# Patient Record
Sex: FEMALE | Race: WHITE | Employment: STUDENT | URBAN - METROPOLITAN AREA
[De-identification: names, ages, dates, MRNs, and addresses within clinical notes are randomized per-mention and may not be internally consistent; named-entity substitution may affect disease eponyms.]

---
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
## 2022-05-04 ENCOUNTER — HOSPITAL ENCOUNTER (EMERGENCY)
Age: 22
Discharge: LEFT BEFORE DISCHARGE | End: 2022-05-04
Payer: COMMERCIAL

## 2022-07-27 ENCOUNTER — TELEPHONE (OUTPATIENT)
Dept: OBGYN CLINIC | Facility: CLINIC | Age: 22
End: 2022-07-27

## 2022-07-27 NOTE — TELEPHONE ENCOUNTER
Patient states has been having a lot of pressure for months. Denies any fever, chills, or body aches. Discussed pain and bleeding precautions. If you experience bleeding that is heavy enough that you are soaking through a large/super tampon or large/overnight pad in an hour or less, if you experience unmanageable/severe pain, have chest pain/chest heaviness, shortness of breath, or feel faint/dizzy, please go to the ER. I don't anticipate you experiencing this, but want you to know what to do just in case.

## 2022-07-27 NOTE — TELEPHONE ENCOUNTER
Pt called stating for past few months she's been having a feeling of something coming out of vagina. Last few days feeling has gotten worse. And when she touches vagina can feel something. I did schedule pt for Friday but wasn't sure if she should be triaged.  Also she normally sees Iggy Clark but I got her in with an MD.    Future Appointments   Date Time Provider Krishna Bhakta   7/29/2022  7:30 AM Femi Thakkar MD [facility]

## 2022-07-29 ENCOUNTER — OFFICE VISIT (OUTPATIENT)
Dept: OBGYN CLINIC | Facility: CLINIC | Age: 22
End: 2022-07-29
Payer: COMMERCIAL

## 2022-07-29 VITALS
HEIGHT: 69 IN | SYSTOLIC BLOOD PRESSURE: 110 MMHG | DIASTOLIC BLOOD PRESSURE: 60 MMHG | BODY MASS INDEX: 26.9 KG/M2 | WEIGHT: 181.63 LBS

## 2022-07-29 DIAGNOSIS — R10.2 PELVIC PRESSURE IN FEMALE: ICD-10-CM

## 2022-07-29 DIAGNOSIS — Z80.3 FAMILY HISTORY OF BREAST CANCER: ICD-10-CM

## 2022-07-29 DIAGNOSIS — R19.8 RECTAL PRESSURE: ICD-10-CM

## 2022-07-29 DIAGNOSIS — R10.2 PELVIC PAIN: Primary | ICD-10-CM

## 2022-07-29 DIAGNOSIS — K59.00 CONSTIPATION, UNSPECIFIED CONSTIPATION TYPE: ICD-10-CM

## 2022-07-29 PROCEDURE — 87660 TRICHOMONAS VAGIN DIR PROBE: CPT | Performed by: OBSTETRICS & GYNECOLOGY

## 2022-07-29 PROCEDURE — 87510 GARDNER VAG DNA DIR PROBE: CPT | Performed by: OBSTETRICS & GYNECOLOGY

## 2022-07-29 PROCEDURE — 3008F BODY MASS INDEX DOCD: CPT | Performed by: OBSTETRICS & GYNECOLOGY

## 2022-07-29 PROCEDURE — 99214 OFFICE O/P EST MOD 30 MIN: CPT | Performed by: OBSTETRICS & GYNECOLOGY

## 2022-07-29 PROCEDURE — 3074F SYST BP LT 130 MM HG: CPT | Performed by: OBSTETRICS & GYNECOLOGY

## 2022-07-29 PROCEDURE — 3078F DIAST BP <80 MM HG: CPT | Performed by: OBSTETRICS & GYNECOLOGY

## 2022-07-29 PROCEDURE — 87480 CANDIDA DNA DIR PROBE: CPT | Performed by: OBSTETRICS & GYNECOLOGY

## 2022-07-29 RX ORDER — DESOGESTREL AND ETHINYL ESTRADIOL AND ETHINYL ESTRADIOL 21-5 (28)
1 KIT ORAL DAILY
COMMUNITY
Start: 2022-05-27

## 2022-08-16 RX ORDER — DESOGESTREL AND ETHINYL ESTRADIOL AND ETHINYL ESTRADIOL 21-5 (28)
1 KIT ORAL DAILY
Qty: 84 TABLET | Refills: 0 | Status: SHIPPED | OUTPATIENT
Start: 2022-08-16

## 2022-09-13 PROBLEM — S01.101A OPEN WOUND OF RIGHT PERIOCULAR AREA: Status: ACTIVE | Noted: 2020-10-23

## 2022-09-13 PROBLEM — S73.109A HIP SPRAIN: Status: ACTIVE | Noted: 2020-11-19

## 2022-10-23 ENCOUNTER — HOSPITAL ENCOUNTER (EMERGENCY)
Age: 22
Discharge: HOME | End: 2022-10-24
Attending: EMERGENCY MEDICINE
Payer: COMMERCIAL

## 2022-10-23 DIAGNOSIS — L30.9 ECZEMA, UNSPECIFIED TYPE: Primary | ICD-10-CM

## 2022-10-23 DIAGNOSIS — L25.9 CONTACT DERMATITIS, UNSPECIFIED CONTACT DERMATITIS TYPE, UNSPECIFIED TRIGGER: ICD-10-CM

## 2022-10-23 PROCEDURE — 99283 EMERGENCY DEPT VISIT LOW MDM: CPT

## 2022-10-24 VITALS
OXYGEN SATURATION: 98 % | SYSTOLIC BLOOD PRESSURE: 122 MMHG | DIASTOLIC BLOOD PRESSURE: 75 MMHG | BODY MASS INDEX: 24.44 KG/M2 | TEMPERATURE: 98 F | WEIGHT: 165 LBS | HEIGHT: 69 IN | RESPIRATION RATE: 18 BRPM | HEART RATE: 63 BPM

## 2022-10-24 RX ORDER — PREDNISONE 50 MG/1
50 TABLET ORAL DAILY
Qty: 5 TABLET | Refills: 0 | Status: SHIPPED | OUTPATIENT
Start: 2022-10-24

## 2022-10-24 RX ORDER — FAMOTIDINE 20 MG/1
20 TABLET, FILM COATED ORAL 2 TIMES DAILY
Qty: 10 TABLET | Refills: 0 | Status: SHIPPED | OUTPATIENT
Start: 2022-10-24 | End: 2022-10-29

## 2022-10-24 RX ORDER — PREDNISONE 20 MG/1
60 TABLET ORAL ONCE
Status: COMPLETED | OUTPATIENT
Start: 2022-10-24 | End: 2022-10-24

## 2022-10-24 RX ORDER — DIPHENHYDRAMINE HCL 25 MG
50 CAPSULE ORAL EVERY 8 HOURS PRN
Qty: 30 CAPSULE | Refills: 0 | Status: SHIPPED | OUTPATIENT
Start: 2022-10-24

## 2022-10-24 RX ORDER — FAMOTIDINE 20 MG/1
20 TABLET, FILM COATED ORAL ONCE
Status: COMPLETED | OUTPATIENT
Start: 2022-10-24 | End: 2022-10-24

## 2022-10-24 RX ORDER — DIPHENHYDRAMINE HCL 50 MG
50 CAPSULE ORAL ONCE
Status: COMPLETED | OUTPATIENT
Start: 2022-10-24 | End: 2022-10-24

## 2022-10-24 RX ORDER — LIDOCAINE HYDROCHLORIDE 20 MG/ML
10 SOLUTION OROPHARYNGEAL ONCE
Status: COMPLETED | OUTPATIENT
Start: 2022-10-24 | End: 2022-10-24

## 2022-10-24 RX ORDER — LIDOCAINE HYDROCHLORIDE 20 MG/ML
SOLUTION OROPHARYNGEAL
Qty: 100 ML | Refills: 0 | Status: SHIPPED | OUTPATIENT
Start: 2022-10-24

## 2022-10-24 NOTE — ED INITIAL ASSESSMENT (HPI)
Pt to ed with c./o eczema and hives to both hands, \"they feel like they're on fire\"   Hives started Friday morning

## 2022-11-04 ENCOUNTER — LAB ENCOUNTER (OUTPATIENT)
Dept: LAB | Age: 22
End: 2022-11-04
Attending: INTERNAL MEDICINE
Payer: COMMERCIAL

## 2022-11-04 DIAGNOSIS — R63.4 WEIGHT LOSS: ICD-10-CM

## 2022-11-04 DIAGNOSIS — R10.13 EPIGASTRIC PAIN: ICD-10-CM

## 2022-11-04 DIAGNOSIS — R10.30 LOWER ABDOMINAL PAIN: ICD-10-CM

## 2022-11-04 DIAGNOSIS — Z01.818 PRE-OP TESTING: ICD-10-CM

## 2022-11-04 DIAGNOSIS — K59.00 CONSTIPATION, UNSPECIFIED CONSTIPATION TYPE: ICD-10-CM

## 2022-11-04 DIAGNOSIS — R11.2 NAUSEA AND VOMITING, UNSPECIFIED VOMITING TYPE: ICD-10-CM

## 2022-11-04 DIAGNOSIS — R12 CHRONIC HEARTBURN: ICD-10-CM

## 2022-11-04 LAB
ALBUMIN SERPL-MCNC: 4.4 G/DL (ref 3.4–5)
ALBUMIN/GLOB SERPL: 1.4 {RATIO} (ref 1–2)
ALP LIVER SERPL-CCNC: 66 U/L
ALT SERPL-CCNC: 24 U/L
ANION GAP SERPL CALC-SCNC: 7 MMOL/L (ref 0–18)
AST SERPL-CCNC: 34 U/L (ref 15–37)
BASOPHILS # BLD AUTO: 0.03 X10(3) UL (ref 0–0.2)
BASOPHILS NFR BLD AUTO: 0.4 %
BILIRUB SERPL-MCNC: 1.2 MG/DL (ref 0.1–2)
BUN BLD-MCNC: 17 MG/DL (ref 7–18)
CALCIUM BLD-MCNC: 9 MG/DL (ref 8.5–10.1)
CHLORIDE SERPL-SCNC: 107 MMOL/L (ref 98–112)
CO2 SERPL-SCNC: 26 MMOL/L (ref 21–32)
CREAT BLD-MCNC: 0.86 MG/DL
EOSINOPHIL # BLD AUTO: 0.02 X10(3) UL (ref 0–0.7)
EOSINOPHIL NFR BLD AUTO: 0.3 %
ERYTHROCYTE [DISTWIDTH] IN BLOOD BY AUTOMATED COUNT: 12.7 %
FASTING STATUS PATIENT QL REPORTED: NO
GFR SERPLBLD BASED ON 1.73 SQ M-ARVRAT: 99 ML/MIN/1.73M2 (ref 60–?)
GLOBULIN PLAS-MCNC: 3.2 G/DL (ref 2.8–4.4)
GLUCOSE BLD-MCNC: 78 MG/DL (ref 70–99)
HCT VFR BLD AUTO: 41.8 %
HGB BLD-MCNC: 13.6 G/DL
IMM GRANULOCYTES # BLD AUTO: 0.02 X10(3) UL (ref 0–1)
IMM GRANULOCYTES NFR BLD: 0.3 %
LYMPHOCYTES # BLD AUTO: 1.67 X10(3) UL (ref 1–4)
LYMPHOCYTES NFR BLD AUTO: 24.1 %
MCH RBC QN AUTO: 30.8 PG (ref 26–34)
MCHC RBC AUTO-ENTMCNC: 32.5 G/DL (ref 31–37)
MCV RBC AUTO: 94.8 FL
MONOCYTES # BLD AUTO: 0.55 X10(3) UL (ref 0.1–1)
MONOCYTES NFR BLD AUTO: 7.9 %
NEUTROPHILS # BLD AUTO: 4.63 X10 (3) UL (ref 1.5–7.7)
NEUTROPHILS # BLD AUTO: 4.63 X10(3) UL (ref 1.5–7.7)
NEUTROPHILS NFR BLD AUTO: 67 %
OSMOLALITY SERPL CALC.SUM OF ELEC: 290 MOSM/KG (ref 275–295)
PLATELET # BLD AUTO: 276 10(3)UL (ref 150–450)
POTASSIUM SERPL-SCNC: 4.1 MMOL/L (ref 3.5–5.1)
PROT SERPL-MCNC: 7.6 G/DL (ref 6.4–8.2)
RBC # BLD AUTO: 4.41 X10(6)UL
SODIUM SERPL-SCNC: 140 MMOL/L (ref 136–145)
T4 FREE SERPL-MCNC: 1 NG/DL (ref 0.8–1.7)
TSI SER-ACNC: 0.75 MIU/ML (ref 0.36–3.74)
WBC # BLD AUTO: 6.9 X10(3) UL (ref 4–11)

## 2022-11-04 PROCEDURE — 84443 ASSAY THYROID STIM HORMONE: CPT

## 2022-11-04 PROCEDURE — 80053 COMPREHEN METABOLIC PANEL: CPT

## 2022-11-04 PROCEDURE — 85025 COMPLETE CBC W/AUTO DIFF WBC: CPT

## 2022-11-04 PROCEDURE — 36415 COLL VENOUS BLD VENIPUNCTURE: CPT

## 2022-11-04 PROCEDURE — 84439 ASSAY OF FREE THYROXINE: CPT

## 2022-11-05 LAB — SARS-COV-2 RNA RESP QL NAA+PROBE: NOT DETECTED

## 2022-11-07 PROBLEM — R10.13 ABDOMINAL PAIN, EPIGASTRIC: Status: ACTIVE | Noted: 2022-11-07

## 2022-11-07 PROBLEM — R11.0 NAUSEA: Status: ACTIVE | Noted: 2022-11-07

## 2022-11-07 PROBLEM — K21.9 GASTROESOPHAGEAL REFLUX DISEASE WITHOUT ESOPHAGITIS: Status: ACTIVE | Noted: 2022-11-07

## 2022-11-07 PROBLEM — K59.00 CONSTIPATION, UNSPECIFIED: Status: ACTIVE | Noted: 2022-11-07

## 2022-11-07 PROBLEM — K29.30 CHRONIC SUPERFICIAL GASTRITIS WITHOUT BLEEDING: Status: ACTIVE | Noted: 2022-11-07

## 2022-11-07 PROBLEM — R12 CHRONIC HEARTBURN: Status: ACTIVE | Noted: 2022-11-07

## 2022-11-07 PROBLEM — R11.12 PROJECTILE VOMITING: Status: ACTIVE | Noted: 2022-11-07

## 2022-11-07 PROBLEM — R19.4 CHANGE IN BOWEL HABITS: Status: ACTIVE | Noted: 2022-11-07

## 2022-11-29 ENCOUNTER — TELEPHONE (OUTPATIENT)
Dept: OBGYN CLINIC | Facility: CLINIC | Age: 22
End: 2022-11-29

## 2022-11-29 NOTE — TELEPHONE ENCOUNTER
Last OV: 7/29/22 with Dr. Marianne Roberto for gyn problem; last annual: 9/2021  Last refill date: 8/16/22  Follow-up: annual  Next appt.: none scheduled    Patient due for annual. Please contact her to schedule appt and then return to RN pool for refill.  Thank you

## 2022-11-30 RX ORDER — DESOGESTREL AND ETHINYL ESTRADIOL 21-5 (28)
1 KIT ORAL DAILY
Qty: 84 TABLET | Refills: 0 | Status: SHIPPED | OUTPATIENT
Start: 2022-11-30

## 2022-11-30 NOTE — TELEPHONE ENCOUNTER
Future Appointments   Date Time Provider Krishna Bhakta   1/11/2023  1:00 PM REYMUNDO Thrasher [facility]   1/19/2023 12:00 PM KLAEB Cardona [facility] [address]     Please refill until appointment time

## 2022-12-07 NOTE — ED PROVIDER NOTES
Patient Seen in: [facility] Emergency Department    History   Patient presents with:  Eval-P (psychiatric)    Stated Complaint: eval p    HPI    29-year-old female with a history of anxiety and depression who is brought here by EMS with suicidal verenice Diabetes Paternal Grandmother    • Hypertension Paternal Grandmother    • Lipids Paternal Grandmother    • Cancer Paternal Grandfather      lymphoma   • Hypertension Paternal Grandfather        Smoking status: Never Smoker exhibits no tenderness. Abdominal: Soft. Bowel sounds are normal. She exhibits no distension and no mass. There is no tenderness. There is no rebound and no guarding. Musculoskeletal: Normal range of motion. She exhibits no edema or tenderness.    Lymph emergency department or contact PCP for persistent, recurrent, or worsening symptoms. Disposition and Plan     Clinical Impression:  Suicidal ideation  (primary encounter diagnosis)    Disposition:  There is no disposition on file for this visit.     Goyo Vazquez 145

## 2023-01-17 ENCOUNTER — HOSPITAL ENCOUNTER (EMERGENCY)
Age: 23
Discharge: HOME | End: 2023-01-17
Payer: COMMERCIAL

## 2023-01-17 VITALS
RESPIRATION RATE: 20 BRPM | TEMPERATURE: 98 F | HEART RATE: 57 BPM | SYSTOLIC BLOOD PRESSURE: 110 MMHG | HEIGHT: 69 IN | WEIGHT: 156 LBS | DIASTOLIC BLOOD PRESSURE: 62 MMHG | OXYGEN SATURATION: 100 % | BODY MASS INDEX: 23.11 KG/M2

## 2023-01-17 DIAGNOSIS — M62.838 SPASM OF MUSCLE: Primary | ICD-10-CM

## 2023-01-17 DIAGNOSIS — B35.4 TINEA CORPORIS: ICD-10-CM

## 2023-01-17 DIAGNOSIS — S29.012A STRAIN OF THORACIC PARASPINAL MUSCLES EXCLUDING T1 AND T2 LEVELS, INITIAL ENCOUNTER: ICD-10-CM

## 2023-01-17 LAB
ALBUMIN SERPL-MCNC: 4.3 G/DL (ref 3.4–5)
ALBUMIN/GLOB SERPL: 1.3 {RATIO} (ref 1–2)
ALP LIVER SERPL-CCNC: 78 U/L
ALT SERPL-CCNC: 21 U/L
ANION GAP SERPL CALC-SCNC: 6 MMOL/L (ref 0–18)
AST SERPL-CCNC: 34 U/L (ref 15–37)
B-HCG UR QL: NEGATIVE
BASOPHILS # BLD AUTO: 0.02 X10(3) UL (ref 0–0.2)
BASOPHILS NFR BLD AUTO: 0.3 %
BILIRUB SERPL-MCNC: 0.9 MG/DL (ref 0.1–2)
BILIRUB UR QL STRIP.AUTO: NEGATIVE
BUN BLD-MCNC: 13 MG/DL (ref 7–18)
CALCIUM BLD-MCNC: 9.3 MG/DL (ref 8.5–10.1)
CHLORIDE SERPL-SCNC: 106 MMOL/L (ref 98–112)
CLARITY UR REFRACT.AUTO: CLEAR
CO2 SERPL-SCNC: 27 MMOL/L (ref 21–32)
COLOR UR AUTO: YELLOW
CREAT BLD-MCNC: 0.91 MG/DL
EOSINOPHIL # BLD AUTO: 0.05 X10(3) UL (ref 0–0.7)
EOSINOPHIL NFR BLD AUTO: 0.8 %
ERYTHROCYTE [DISTWIDTH] IN BLOOD BY AUTOMATED COUNT: 13.3 %
GFR SERPLBLD BASED ON 1.73 SQ M-ARVRAT: 93 ML/MIN/1.73M2 (ref 60–?)
GLOBULIN PLAS-MCNC: 3.4 G/DL (ref 2.8–4.4)
GLUCOSE BLD-MCNC: 101 MG/DL (ref 70–99)
GLUCOSE UR STRIP.AUTO-MCNC: NEGATIVE MG/DL
HCT VFR BLD AUTO: 39.5 %
HGB BLD-MCNC: 12.9 G/DL
IMM GRANULOCYTES # BLD AUTO: 0.01 X10(3) UL (ref 0–1)
IMM GRANULOCYTES NFR BLD: 0.2 %
KETONES UR STRIP.AUTO-MCNC: NEGATIVE MG/DL
LEUKOCYTE ESTERASE UR QL STRIP.AUTO: NEGATIVE
LYMPHOCYTES # BLD AUTO: 1.44 X10(3) UL (ref 1–4)
LYMPHOCYTES NFR BLD AUTO: 22.5 %
MCH RBC QN AUTO: 30.6 PG (ref 26–34)
MCHC RBC AUTO-ENTMCNC: 32.7 G/DL (ref 31–37)
MCV RBC AUTO: 93.8 FL
MONOCYTES # BLD AUTO: 0.41 X10(3) UL (ref 0.1–1)
MONOCYTES NFR BLD AUTO: 6.4 %
NEUTROPHILS # BLD AUTO: 4.47 X10 (3) UL (ref 1.5–7.7)
NEUTROPHILS # BLD AUTO: 4.47 X10(3) UL (ref 1.5–7.7)
NEUTROPHILS NFR BLD AUTO: 69.8 %
NITRITE UR QL STRIP.AUTO: NEGATIVE
OSMOLALITY SERPL CALC.SUM OF ELEC: 288 MOSM/KG (ref 275–295)
PH UR STRIP.AUTO: 7.5 [PH] (ref 5–8)
PLATELET # BLD AUTO: 270 10(3)UL (ref 150–450)
POTASSIUM SERPL-SCNC: 3.9 MMOL/L (ref 3.5–5.1)
PROT SERPL-MCNC: 7.7 G/DL (ref 6.4–8.2)
PROT UR STRIP.AUTO-MCNC: NEGATIVE MG/DL
RBC # BLD AUTO: 4.21 X10(6)UL
RBC UR QL AUTO: NEGATIVE
SODIUM SERPL-SCNC: 139 MMOL/L (ref 136–145)
SP GR UR STRIP.AUTO: 1.02 (ref 1–1.03)
UROBILINOGEN UR STRIP.AUTO-MCNC: 0.2 MG/DL
WBC # BLD AUTO: 6.4 X10(3) UL (ref 4–11)

## 2023-01-17 PROCEDURE — 85025 COMPLETE CBC W/AUTO DIFF WBC: CPT | Performed by: PHYSICIAN ASSISTANT

## 2023-01-17 PROCEDURE — 81025 URINE PREGNANCY TEST: CPT

## 2023-01-17 PROCEDURE — 99284 EMERGENCY DEPT VISIT MOD MDM: CPT

## 2023-01-17 PROCEDURE — 96361 HYDRATE IV INFUSION ADD-ON: CPT

## 2023-01-17 PROCEDURE — 80053 COMPREHEN METABOLIC PANEL: CPT | Performed by: PHYSICIAN ASSISTANT

## 2023-01-17 PROCEDURE — 81003 URINALYSIS AUTO W/O SCOPE: CPT | Performed by: PHYSICIAN ASSISTANT

## 2023-01-17 PROCEDURE — 96374 THER/PROPH/DIAG INJ IV PUSH: CPT

## 2023-01-17 PROCEDURE — 96375 TX/PRO/DX INJ NEW DRUG ADDON: CPT

## 2023-01-17 RX ORDER — METHYLPREDNISOLONE 4 MG/1
TABLET ORAL
Qty: 1 EACH | Refills: 0 | Status: SHIPPED | OUTPATIENT
Start: 2023-01-17

## 2023-01-17 RX ORDER — METHOCARBAMOL 750 MG/1
750 TABLET, FILM COATED ORAL 4 TIMES DAILY
Qty: 21 TABLET | Refills: 0 | Status: SHIPPED | OUTPATIENT
Start: 2023-01-17

## 2023-01-17 RX ORDER — DEXAMETHASONE SODIUM PHOSPHATE 10 MG/ML
10 INJECTION, SOLUTION INTRAMUSCULAR; INTRAVENOUS ONCE
Status: COMPLETED | OUTPATIENT
Start: 2023-01-17 | End: 2023-01-17

## 2023-01-17 RX ORDER — KETOROLAC TROMETHAMINE 30 MG/ML
30 INJECTION, SOLUTION INTRAMUSCULAR; INTRAVENOUS ONCE
Status: COMPLETED | OUTPATIENT
Start: 2023-01-17 | End: 2023-01-17

## 2023-01-17 RX ORDER — ONDANSETRON 2 MG/ML
4 INJECTION INTRAMUSCULAR; INTRAVENOUS ONCE
Status: COMPLETED | OUTPATIENT
Start: 2023-01-17 | End: 2023-01-17

## 2023-01-17 RX ORDER — NAPROXEN 500 MG/1
500 TABLET ORAL 2 TIMES DAILY PRN
Qty: 20 TABLET | Refills: 0 | Status: SHIPPED | OUTPATIENT
Start: 2023-01-17 | End: 2023-01-24

## 2023-01-17 RX ORDER — DIAZEPAM 5 MG/1
5 TABLET ORAL ONCE
Status: COMPLETED | OUTPATIENT
Start: 2023-01-17 | End: 2023-01-17

## 2023-01-17 RX ORDER — KETOCONAZOLE 20 MG/G
1 CREAM TOPICAL DAILY
Qty: 15 G | Refills: 0 | Status: SHIPPED | OUTPATIENT
Start: 2023-01-17

## 2023-01-17 NOTE — DISCHARGE INSTRUCTIONS
Steroid in the morning with breakfast.  Naproxen in the afternoon and evening. Muscle relaxant at night. This medication will cause sedation. Push clear fluids. Ice the area.   Progress activity as tolerated

## 2024-09-04 ENCOUNTER — OFFICE VISIT (OUTPATIENT)
Dept: FAMILY MEDICINE CLINIC | Facility: CLINIC | Age: 24
End: 2024-09-04
Payer: COMMERCIAL

## 2024-09-04 VITALS
HEART RATE: 93 BPM | OXYGEN SATURATION: 99 % | RESPIRATION RATE: 18 BRPM | SYSTOLIC BLOOD PRESSURE: 120 MMHG | DIASTOLIC BLOOD PRESSURE: 68 MMHG | HEIGHT: 69 IN | TEMPERATURE: 98 F | WEIGHT: 151 LBS | BODY MASS INDEX: 22.36 KG/M2

## 2024-09-04 DIAGNOSIS — S16.1XXA STRAIN OF NECK MUSCLE, INITIAL ENCOUNTER: ICD-10-CM

## 2024-09-04 DIAGNOSIS — G44.209 TENSION HEADACHE: Primary | ICD-10-CM

## 2024-09-04 DIAGNOSIS — F32.A DEPRESSION, UNSPECIFIED DEPRESSION TYPE: ICD-10-CM

## 2024-09-04 DIAGNOSIS — M62.838 NECK MUSCLE SPASM: ICD-10-CM

## 2024-09-04 PROCEDURE — 3074F SYST BP LT 130 MM HG: CPT | Performed by: FAMILY MEDICINE

## 2024-09-04 PROCEDURE — 3008F BODY MASS INDEX DOCD: CPT | Performed by: FAMILY MEDICINE

## 2024-09-04 PROCEDURE — 3078F DIAST BP <80 MM HG: CPT | Performed by: FAMILY MEDICINE

## 2024-09-04 PROCEDURE — 99214 OFFICE O/P EST MOD 30 MIN: CPT | Performed by: FAMILY MEDICINE

## 2024-09-04 RX ORDER — BACLOFEN 10 MG/1
10 TABLET ORAL NIGHTLY
Qty: 30 TABLET | Refills: 0 | Status: SHIPPED | OUTPATIENT
Start: 2024-09-04 | End: 2024-10-04

## 2024-09-04 RX ORDER — ESCITALOPRAM OXALATE 10 MG/1
10 TABLET ORAL DAILY
Qty: 30 TABLET | Refills: 2 | Status: SHIPPED | OUTPATIENT
Start: 2024-09-04

## 2024-09-16 NOTE — PROGRESS NOTES
Subjective:   Negar Mishra is a 24 year old female who presents for Lump (Patient states she noticed a lump on the back right side of her neck 3 weeks ago. Patient states it is tender to touch.) and Headache (Patient states she has been having headaches for the past 4 months.) Patient states that she has been having neck pain and a lump in the neck for the last few months with increase in headaches. Patient states that she has been worried that she has something seriously wrong with her health. Patient states that she was previously on antidepressive medications and that she feels like she needs to get back on the antidepressants because her mood was much better when taking that medication.       History/Other:    Chief Complaint Reviewed and Verified  Nursing Notes Reviewed and   Verified  Tobacco Reviewed  Allergies Reviewed  Medications Reviewed    OB Status Reviewed  Social History Reviewed         Tobacco:  Social History     Tobacco Use   Smoking Status Never   Smokeless Tobacco Never     E-Cigarettes/Vaping       Questions Responses    E-Cigarette Use Current Every Day User          E-Cigarette/Vaping Substances       Questions Responses    Nicotine Yes           Tobacco cessation counseling for <3 minutes.      Current Outpatient Medications   Medication Sig Dispense Refill    escitalopram 10 MG Oral Tab Take 1 tablet (10 mg total) by mouth daily. 30 tablet 2    baclofen 10 MG Oral Tab Take 1 tablet (10 mg total) by mouth at bedtime. 30 tablet 0    diphenhydrAMINE 25 MG Oral Cap Take 2 capsules (50 mg total) by mouth every 8 (eight) hours as needed for Itching. 30 capsule 0         Review of Systems:  Review of Systems   Constitutional: Negative.    HENT:  Positive for congestion, postnasal drip and rhinorrhea.    Respiratory: Negative.     Cardiovascular: Negative.    Gastrointestinal: Negative.    Musculoskeletal:  Positive for myalgias, neck pain and neck stiffness.   Skin: Negative.     Allergic/Immunologic: Positive for environmental allergies.   Neurological:  Positive for headaches.   Psychiatric/Behavioral:  Positive for decreased concentration and dysphoric mood. The patient is nervous/anxious.        Objective:   /68 (BP Location: Right arm, Patient Position: Sitting, Cuff Size: adult)   Pulse 93   Temp 97.8 °F (36.6 °C) (Temporal)   Resp 18   Ht 5' 9\" (1.753 m)   Wt 151 lb (68.5 kg)   LMP 08/31/2024 (Exact Date)   SpO2 99%   BMI 22.30 kg/m²  Estimated body mass index is 22.3 kg/m² as calculated from the following:    Height as of this encounter: 5' 9\" (1.753 m).    Weight as of this encounter: 151 lb (68.5 kg).  Physical Exam  Vitals reviewed.   Constitutional:       General: She is not in acute distress.     Appearance: Normal appearance. She is normal weight. She is not ill-appearing.   HENT:      Head: Normocephalic and atraumatic.      Right Ear: There is no impacted cerumen.      Nose: Nose normal.      Mouth/Throat:      Mouth: Mucous membranes are moist.      Pharynx: Oropharynx is clear.   Eyes:      Conjunctiva/sclera: Conjunctivae normal.      Pupils: Pupils are equal, round, and reactive to light.   Cardiovascular:      Rate and Rhythm: Normal rate and regular rhythm.      Pulses: Normal pulses.      Heart sounds: Normal heart sounds.   Pulmonary:      Effort: Pulmonary effort is normal.      Breath sounds: Normal breath sounds.   Abdominal:      General: Abdomen is flat.      Palpations: Abdomen is soft.   Musculoskeletal:         General: Tenderness present. No signs of injury.      Cervical back: Neck supple. Torticollis present. Pain with movement present. Decreased range of motion.   Skin:     General: Skin is warm and dry.      Capillary Refill: Capillary refill takes less than 2 seconds.   Neurological:      Mental Status: She is alert.   Psychiatric:         Mood and Affect: Mood normal.         Behavior: Behavior normal.         Thought Content: Thought  content normal.         Judgment: Judgment normal.           Assessment & Plan:   1. Tension headache (Primary)  -     Baclofen; Take 1 tablet (10 mg total) by mouth at bedtime.  Dispense: 30 tablet; Refill: 0  2. Strain of neck muscle, initial encounter  -     Baclofen; Take 1 tablet (10 mg total) by mouth at bedtime.  Dispense: 30 tablet; Refill: 0  3. Neck muscle spasm  -     Baclofen; Take 1 tablet (10 mg total) by mouth at bedtime.  Dispense: 30 tablet; Refill: 0  4. Depression, unspecified depression type  -     Escitalopram Oxalate; Take 1 tablet (10 mg total) by mouth daily.  Dispense: 30 tablet; Refill: 2  Use heat, massage, and the medication to help relieve the muscle tension.   Use heat, massage, and stretching every time you take a muscle relaxer otherwise they will not have the desired effects.   Muscle relaxants can make you very tired. Be aware of this before taking the medication.   If you have continued pain or discomfort. Follow up with this office immediately.   If pain or discomfort does not progressively get better, will consider PT for evaluation and treatment.       Follow up in 1 month.   REYMUNDO Valderrama, 9/16/2024, 12:19 PM

## 2024-10-08 ENCOUNTER — OFFICE VISIT (OUTPATIENT)
Dept: FAMILY MEDICINE CLINIC | Facility: CLINIC | Age: 24
End: 2024-10-08
Payer: COMMERCIAL

## 2024-10-08 VITALS
TEMPERATURE: 98 F | OXYGEN SATURATION: 99 % | SYSTOLIC BLOOD PRESSURE: 110 MMHG | RESPIRATION RATE: 18 BRPM | HEART RATE: 71 BPM | BODY MASS INDEX: 22.69 KG/M2 | WEIGHT: 153.19 LBS | HEIGHT: 69 IN | DIASTOLIC BLOOD PRESSURE: 64 MMHG

## 2024-10-08 DIAGNOSIS — R51.9 CHRONIC NONINTRACTABLE HEADACHE, UNSPECIFIED HEADACHE TYPE: Primary | ICD-10-CM

## 2024-10-08 DIAGNOSIS — Z23 NEED FOR VACCINATION: ICD-10-CM

## 2024-10-08 DIAGNOSIS — G89.29 CHRONIC NONINTRACTABLE HEADACHE, UNSPECIFIED HEADACHE TYPE: Primary | ICD-10-CM

## 2024-10-08 DIAGNOSIS — M62.838 NECK MUSCLE SPASM: ICD-10-CM

## 2024-10-08 DIAGNOSIS — Z13.79 GENETIC TESTING OF FEMALE: ICD-10-CM

## 2024-10-08 DIAGNOSIS — S16.1XXA STRAIN OF NECK MUSCLE, INITIAL ENCOUNTER: ICD-10-CM

## 2024-10-08 DIAGNOSIS — G44.209 TENSION HEADACHE: ICD-10-CM

## 2024-10-08 DIAGNOSIS — F32.A DEPRESSION, UNSPECIFIED DEPRESSION TYPE: ICD-10-CM

## 2024-10-08 PROCEDURE — 3074F SYST BP LT 130 MM HG: CPT | Performed by: FAMILY MEDICINE

## 2024-10-08 PROCEDURE — 99214 OFFICE O/P EST MOD 30 MIN: CPT | Performed by: FAMILY MEDICINE

## 2024-10-08 PROCEDURE — 90471 IMMUNIZATION ADMIN: CPT | Performed by: FAMILY MEDICINE

## 2024-10-08 PROCEDURE — 90656 IIV3 VACC NO PRSV 0.5 ML IM: CPT | Performed by: FAMILY MEDICINE

## 2024-10-08 PROCEDURE — 3008F BODY MASS INDEX DOCD: CPT | Performed by: FAMILY MEDICINE

## 2024-10-08 PROCEDURE — 3078F DIAST BP <80 MM HG: CPT | Performed by: FAMILY MEDICINE

## 2024-10-09 RX ORDER — ESCITALOPRAM OXALATE 20 MG/1
20 TABLET ORAL DAILY
Qty: 30 TABLET | Refills: 2 | Status: SHIPPED | OUTPATIENT
Start: 2024-10-09

## 2024-10-14 NOTE — PROGRESS NOTES
Subjective:   Negar Mishra is a 24 year old female who presents for Headache (Patient states still having tension headaches.) and Neck Pain (Patient states still having neck pain, not sure what could be causing it.)   Patient states that she is still having headaches and muscle tension in the head and neck. Patient states that she is using the mediations as prescribed. Patient states that she would like to increase her lexapro as discussed in previous visit. Patient is also concerned about EDS. Patient states that she has a cousin that has EDS and she is wondering if she has it due to her extensive msk injury hx.       History/Other:    Chief Complaint Reviewed and Verified  Nursing Notes Reviewed and   Verified  Tobacco Reviewed  Allergies Reviewed  Medications Reviewed    Problem List Reviewed  Medical History Reviewed  Surgical History   Reviewed  OB Status Reviewed  Family History Reviewed  Social History   Reviewed         Tobacco:  Social History     Tobacco Use   Smoking Status Never   Smokeless Tobacco Never     E-Cigarettes/Vaping       Questions Responses    E-Cigarette Use Current Every Day User          E-Cigarette/Vaping Substances       Questions Responses    Nicotine Yes           Tobacco cessation counseling for <3 minutes.      Current Outpatient Medications   Medication Sig Dispense Refill    baclofen 10 MG Oral Tab Take 1 tablet (10 mg total) by mouth at bedtime. 30 tablet 0    escitalopram 20 MG Oral Tab Take 1 tablet (20 mg total) by mouth daily. 30 tablet 2    diphenhydrAMINE 25 MG Oral Cap Take 2 capsules (50 mg total) by mouth every 8 (eight) hours as needed for Itching. (Patient not taking: Reported on 10/8/2024) 30 capsule 0         Review of Systems:  Review of Systems   Constitutional: Negative.    HENT: Negative.     Eyes: Negative.    Respiratory: Negative.     Cardiovascular: Negative.    Gastrointestinal: Negative.    Endocrine: Negative.    Genitourinary: Negative.     Musculoskeletal:  Positive for myalgias, neck pain and neck stiffness.   Skin: Negative.    Neurological:  Positive for headaches.   Psychiatric/Behavioral:  Positive for dysphoric mood. The patient is nervous/anxious.      Objective:   /64 (BP Location: Right arm, Patient Position: Sitting, Cuff Size: adult)   Pulse 71   Temp 97.5 °F (36.4 °C) (Temporal)   Resp 18   Ht 5' 9\" (1.753 m)   Wt 153 lb 3.2 oz (69.5 kg)   LMP 10/05/2024 (Exact Date)   SpO2 99%   BMI 22.62 kg/m²  Estimated body mass index is 22.62 kg/m² as calculated from the following:    Height as of this encounter: 5' 9\" (1.753 m).    Weight as of this encounter: 153 lb 3.2 oz (69.5 kg).  Physical Exam  Constitutional:       General: She is not in acute distress.     Appearance: Normal appearance. She is well-developed. She is not ill-appearing.   HENT:      Head: Normocephalic and atraumatic.      Nose: Nose normal.      Mouth/Throat:      Mouth: Mucous membranes are moist.      Pharynx: Oropharynx is clear.   Eyes:      Conjunctiva/sclera: Conjunctivae normal.   Cardiovascular:      Rate and Rhythm: Normal rate.   Pulmonary:      Effort: Pulmonary effort is normal.   Musculoskeletal:      Cervical back: Rigidity present.   Skin:     General: Skin is warm and dry.   Neurological:      General: No focal deficit present.      Mental Status: She is alert and oriented to person, place, and time.   Psychiatric:         Mood and Affect: Mood is anxious and depressed.         Behavior: Behavior normal.         Thought Content: Thought content normal.         Judgment: Judgment normal.           Assessment & Plan:   1. Chronic nonintractable headache, unspecified headache type (Primary)  -     Neuro Referral - In Network  2. Depression, unspecified depression type  -     Escitalopram Oxalate; Take 1 tablet (20 mg total) by mouth daily.  Dispense: 30 tablet; Refill: 2  3. Strain of neck muscle, initial encounter  4. Neck muscle spasm  5.  Tension headache  6. Genetic testing of female  -     Genetic Referral - In Network  7. Need for vaccination  -     Immunization Admin Counseling, 1st Component, 18 years and older  -     Fluzone trivalent vaccine, PF 0.5mL, 6mo+ (27593)  Other orders  -     Baclofen; Take 1 tablet (10 mg total) by mouth at bedtime.  Dispense: 30 tablet; Refill: 0  Continue to use the muscle relaxer  Use heat, massage, and the medication to help relieve the muscle tension.   Use heat, massage, and stretching every time you take a muscle relaxer otherwise they will not have the desired effects.   Muscle relaxants can make you very tired. Be aware of this before taking the medication.   If you have continued pain or discomfort. Follow up with this office immediately.   If pain or discomfort does not progressively get better, will consider PT for evaluation and treatment.       REYMUNDO Valderrama, 10/14/2024, 4:05 PM

## 2024-10-16 RX ORDER — BACLOFEN 10 MG/1
10 TABLET ORAL NIGHTLY
Qty: 30 TABLET | Refills: 0 | Status: SHIPPED | OUTPATIENT
Start: 2024-10-16 | End: 2024-11-15

## 2024-10-25 ENCOUNTER — LAB ENCOUNTER (OUTPATIENT)
Dept: LAB | Age: 24
End: 2024-10-25
Attending: FAMILY MEDICINE
Payer: COMMERCIAL

## 2024-10-25 DIAGNOSIS — Z00.00 LABORATORY TESTS ORDERED AS PART OF A COMPLETE PHYSICAL EXAM (CPE): Primary | ICD-10-CM

## 2024-10-28 ENCOUNTER — LAB ENCOUNTER (OUTPATIENT)
Dept: LAB | Age: 24
End: 2024-10-28
Attending: FAMILY MEDICINE
Payer: COMMERCIAL

## 2024-10-28 DIAGNOSIS — Z00.00 LABORATORY TESTS ORDERED AS PART OF A COMPLETE PHYSICAL EXAM (CPE): ICD-10-CM

## 2024-10-28 LAB
ALBUMIN SERPL-MCNC: 4.9 G/DL (ref 3.2–4.8)
ALBUMIN/GLOB SERPL: 1.8 {RATIO} (ref 1–2)
ALP LIVER SERPL-CCNC: 91 U/L
ALT SERPL-CCNC: 18 U/L
ANION GAP SERPL CALC-SCNC: 5 MMOL/L (ref 0–18)
AST SERPL-CCNC: 35 U/L (ref ?–34)
BASOPHILS # BLD AUTO: 0.02 X10(3) UL (ref 0–0.2)
BASOPHILS NFR BLD AUTO: 0.3 %
BILIRUB SERPL-MCNC: 1.6 MG/DL (ref 0.3–1.2)
BUN BLD-MCNC: 10 MG/DL (ref 9–23)
CALCIUM BLD-MCNC: 10.2 MG/DL (ref 8.7–10.4)
CHLORIDE SERPL-SCNC: 106 MMOL/L (ref 98–112)
CHOLEST SERPL-MCNC: 151 MG/DL (ref ?–200)
CO2 SERPL-SCNC: 28 MMOL/L (ref 21–32)
CREAT BLD-MCNC: 0.86 MG/DL
EGFRCR SERPLBLD CKD-EPI 2021: 98 ML/MIN/1.73M2 (ref 60–?)
EOSINOPHIL # BLD AUTO: 0.06 X10(3) UL (ref 0–0.7)
EOSINOPHIL NFR BLD AUTO: 1 %
ERYTHROCYTE [DISTWIDTH] IN BLOOD BY AUTOMATED COUNT: 12.7 %
EST. AVERAGE GLUCOSE BLD GHB EST-MCNC: 100 MG/DL (ref 68–126)
FASTING PATIENT LIPID ANSWER: YES
FASTING STATUS PATIENT QL REPORTED: YES
GLOBULIN PLAS-MCNC: 2.7 G/DL (ref 2–3.5)
GLUCOSE BLD-MCNC: 99 MG/DL (ref 70–99)
HBA1C MFR BLD: 5.1 % (ref ?–5.7)
HCT VFR BLD AUTO: 40 %
HDLC SERPL-MCNC: 48 MG/DL (ref 40–59)
HGB BLD-MCNC: 13.3 G/DL
IMM GRANULOCYTES # BLD AUTO: 0.01 X10(3) UL (ref 0–1)
IMM GRANULOCYTES NFR BLD: 0.2 %
LDLC SERPL CALC-MCNC: 91 MG/DL (ref ?–100)
LYMPHOCYTES # BLD AUTO: 2.04 X10(3) UL (ref 1–4)
LYMPHOCYTES NFR BLD AUTO: 34.5 %
MCH RBC QN AUTO: 31.3 PG (ref 26–34)
MCHC RBC AUTO-ENTMCNC: 33.3 G/DL (ref 31–37)
MCV RBC AUTO: 94.1 FL
MONOCYTES # BLD AUTO: 0.48 X10(3) UL (ref 0.1–1)
MONOCYTES NFR BLD AUTO: 8.1 %
NEUTROPHILS # BLD AUTO: 3.31 X10 (3) UL (ref 1.5–7.7)
NEUTROPHILS # BLD AUTO: 3.31 X10(3) UL (ref 1.5–7.7)
NEUTROPHILS NFR BLD AUTO: 55.9 %
NONHDLC SERPL-MCNC: 103 MG/DL (ref ?–130)
OSMOLALITY SERPL CALC.SUM OF ELEC: 287 MOSM/KG (ref 275–295)
PLATELET # BLD AUTO: 230 10(3)UL (ref 150–450)
POTASSIUM SERPL-SCNC: 4 MMOL/L (ref 3.5–5.1)
PROT SERPL-MCNC: 7.6 G/DL (ref 5.7–8.2)
RBC # BLD AUTO: 4.25 X10(6)UL
SODIUM SERPL-SCNC: 139 MMOL/L (ref 136–145)
TRIGL SERPL-MCNC: 61 MG/DL (ref 30–149)
TSI SER-ACNC: 1.78 MIU/ML (ref 0.55–4.78)
VIT D+METAB SERPL-MCNC: 25.7 NG/ML (ref 30–100)
VLDLC SERPL CALC-MCNC: 10 MG/DL (ref 0–30)
WBC # BLD AUTO: 5.9 X10(3) UL (ref 4–11)

## 2024-10-28 PROCEDURE — 80050 GENERAL HEALTH PANEL: CPT | Performed by: FAMILY MEDICINE

## 2024-10-28 PROCEDURE — 83036 HEMOGLOBIN GLYCOSYLATED A1C: CPT | Performed by: FAMILY MEDICINE

## 2024-10-28 PROCEDURE — 80061 LIPID PANEL: CPT | Performed by: FAMILY MEDICINE

## 2024-10-28 PROCEDURE — 82306 VITAMIN D 25 HYDROXY: CPT | Performed by: FAMILY MEDICINE

## 2024-10-29 DIAGNOSIS — E55.9 VITAMIN D DEFICIENCY: Primary | ICD-10-CM

## 2024-12-02 NOTE — ED NOTES
SUBJECTIVE:    Patient ID: Edwin Conklin is a 67 y.o. male.    Chief Complaint: Annual Exam (Lab work in Epic/ declines PNA/ tired a lot / gets up twice nightly to go to the bathroom/ chronic low back pain//mp)    HPI    History of Present Illness    CHIEF COMPLAINT:  Edwin presents today with fatigue and frequent urination at night.    URINARY SYMPTOMS:  He reports frequent urination at night, waking up 2 to 4 times per night to urinate. He experiences incomplete bladder emptying, often feeling the need to urinate again shortly after lying down, even after voiding before bedtime. This nocturia is disrupting his sleep, leading to fatigue.    FATIGUE:  He reports feeling tired all the time and not being energized in the morning, which he attributes to his disrupted sleep due to nocturia.    LOWER BACK PAIN:  He experiences lower back pain when walking, requiring him to stop and stretch for relief. The pain is also present when first lying down but subsides after some time. This limits his ability to walk long distances. He denies radiation of pain down the legs and is not taking any OTC pain medications for this condition.    ERECTILE DYSFUNCTION:  He reports ongoing issues with erectile dysfunction. Viagra 50mg is sometimes ineffective, and he's considering increasing the dosage to 100mg. He expresses frustration with Viagra's timing restrictions, particularly in relation to meals. He's interested in trying Cialis for its longer duration of action and flexibility in timing. He denies libido issues but feels stressed about maintaining an erection during sexual activity.    SLEEP:  He uses a CPAP machine nightly.    MEDICATIONS:  He takes testosterone shots every two weeks.    SOCIAL HISTORY:  He works at [employer], primarily in a sedentary position. He drinks beer usually on weekends.  Nonsmoker,    OTHER MEDICAL INFORMATION:  He donates one pint of blood every three months. His hematocrit is 49, just  Pt watching TV and using phone below the cutoff for blood donation.      ROS:  Constitutional: -appetite change, -chills, +fatigue, -fever, -unexpected weight change  HENT: -ear pain, -trouble swallowing  Eyes: -pain, -discharge, -visual disturbance  Respiratory: -apnea, -cough, -shortness of breath, -wheezing  Cardiovascular: -chest pain, -leg swelling  Gastrointestinal: -abdominal pain, -blood in stool, -constipation, -diarrhea, -nausea, -vomiting, -reflux  Endocrine: -cold intolerance, -heat intolerance, -polydipsia  Genitourinary: -bladder incontinence, -dysuria, +erectile dysfunction, -frequency, -hematuria, -testicular pain, -urgency, +nocturia  Musculoskeletal: -gait problem, -joint swelling, -myalgia, +back pain  Neurological: -dizziness, -seizures, -numbness  Psychiatric/Behavioral: -agitation, -hallucinations, -nervous, -anxiety symptoms  Psychiatric: +sleep difficulty         Office Visit on 09/19/2024   Component Date Value Ref Range Status    TSH w/reflex to FT4 11/26/2024 1.65  0.40 - 4.50 mIU/L Final    Cholesterol 11/26/2024 163  <200 mg/dL Final    HDL 11/26/2024 38 (L)  > OR = 40 mg/dL Final    Triglycerides 11/26/2024 132  <150 mg/dL Final    LDL Cholesterol 11/26/2024 102 (H)  mg/dL (calc) Final    HDL/Cholesterol Ratio 11/26/2024 4.3  <5.0 (calc) Final    Non HDL Chol. (LDL+VLDL) 11/26/2024 125  <130 mg/dL (calc) Final    WBC 11/26/2024 6.1  3.8 - 10.8 Thousand/uL Final    RBC 11/26/2024 5.82 (H)  4.20 - 5.80 Million/uL Final    Hemoglobin 11/26/2024 15.3  13.2 - 17.1 g/dL Final    Hematocrit 11/26/2024 49.4  38.5 - 50.0 % Final    MCV 11/26/2024 84.9  80.0 - 100.0 fL Final    MCH 11/26/2024 26.3 (L)  27.0 - 33.0 pg Final    MCHC 11/26/2024 31.0 (L)  32.0 - 36.0 g/dL Final    RDW 11/26/2024 16.0 (H)  11.0 - 15.0 % Final    Platelets 11/26/2024 236  140 - 400 Thousand/uL Final    MPV 11/26/2024 9.1  7.5 - 12.5 fL Final    Neutrophils, Abs 11/26/2024 3,770  1,500 - 7,800 cells/uL Final    Lymph # 11/26/2024 1,373  850 - 3,900  cells/uL Final    Mono # 11/26/2024 677  200 - 950 cells/uL Final    Eos # 11/26/2024 189  15 - 500 cells/uL Final    Baso # 11/26/2024 92  0 - 200 cells/uL Final    Neutrophils Relative 11/26/2024 61.8  % Final    Lymph % 11/26/2024 22.5  % Final    Mono % 11/26/2024 11.1  % Final    Eosinophil % 11/26/2024 3.1  % Final    Basophil % 11/26/2024 1.5  % Final   Hospital Outpatient Visit on 09/18/2024   Component Date Value Ref Range Status    LVOT stroke volume 09/18/2024 86.07  cm3 Final    LVIDd 09/18/2024 5.23  3.5 - 6.0 cm Final    LV Systolic Volume 09/18/2024 49.27  mL Final    LVIDs 09/18/2024 3.45  2.1 - 4.0 cm Final    LV Diastolic Volume 09/18/2024 130.98  mL Final    Left Ventricular End Systolic Volu* 09/18/2024 49.27  mL Final    Left Ventricular End Diastolic Vol* 09/18/2024 130.98  mL Final    IVS 09/18/2024 0.80  0.6 - 1.1 cm Final    LVOT diameter 09/18/2024 2.16  cm Final    LVOT area 09/18/2024 3.7  cm2 Final    FS 09/18/2024 34  28 - 44 % Final    Left Ventricle Relative Wall Thick* 09/18/2024 0.44  cm Final    PW 09/18/2024 1.16 (A)  0.6 - 1.1 cm Final    LV mass 09/18/2024 190.83  g Final    MV Peak E Chris 09/18/2024 0.79  m/s Final    TDI LATERAL 09/18/2024 0.08  m/s Final    TDI SEPTAL 09/18/2024 0.09  m/s Final    E/E' ratio 09/18/2024 9.29  m/s Final    MV Peak A Chris 09/18/2024 0.82  m/s Final    TR Max Chris 09/18/2024 2.30  m/s Final    E/A ratio 09/18/2024 0.96   Final    E wave deceleration time 09/18/2024 144.98  msec Final    LV SEPTAL E/E' RATIO 09/18/2024 8.78  m/s Final    LV LATERAL E/E' RATIO 09/18/2024 9.88  m/s Final    LVOT peak chris 09/18/2024 1.07  m/s Final    Left Ventricular Outflow Tract Velma* 09/18/2024 0.67  cm/s Final    Left Ventricular Outflow Tract Velma* 09/18/2024 2.10  mmHg Final    RV/LV Ratio 09/18/2024 0.54  cm Final    LA size 09/18/2024 3.74  cm Final    AV mean gradient 09/18/2024 3  mmHg Final    AV peak gradient 09/18/2024 5  mmHg Final    Ao peak chris 09/18/2024  1.17  m/s Final    Ao VTI 09/18/2024 27.90  cm Final    LVOT peak VTI 09/18/2024 23.50  cm Final    AV valve area 09/18/2024 3.08  cm² Final    AV Velocity Ratio 09/18/2024 0.91   Final    AV index (prosthetic) 09/18/2024 0.84   Final    MORA by Velocity Ratio 09/18/2024 3.35  cm² Final    MV mean gradient 09/18/2024 2  mmHg Final    MV peak gradient 09/18/2024 3  mmHg Final    MV valve area by continuity eq 09/18/2024 3.44  cm2 Final    MV VTI 09/18/2024 25.0  cm Final    Triscuspid Valve Regurgitation Pea* 09/18/2024 21  mmHg Final    PV PEAK VELOCITY 09/18/2024 0.85  m/s Final    PV peak gradient 09/18/2024 3  mmHg Final    Ao root annulus 09/18/2024 2.92  cm Final    Mean e' 09/18/2024 0.09  m/s Final    RVDD 09/18/2024 2.80  cm Final    AORTIC VALVE CUSP SEPERATION 09/18/2024 2.16  cm Final    TV resting pulmonary artery pressu* 09/18/2024 29  mmHg Final    RV TB RVSP 09/18/2024 10  mmHg Final    Est. RA pres 09/18/2024 8  mmHg Final   Hospital Outpatient Visit on 09/18/2024   Component Date Value Ref Range Status    85% Max Predicted HR 09/18/2024 132   Final    Max Predicted HR 09/18/2024 155   Final    [facility] CV CPX PATIENT IS MALE 09/18/2024 1.0   Final    [facility] CV CPX PATIENT IS FEMALE 09/18/2024 0.0   Final    HR at rest 09/18/2024 71  bpm Final    Systolic blood pressure 09/18/2024 112  mmHg Final    Diastolic blood pressure 09/18/2024 64  mmHg Final    RPP 09/18/2024 7,952   Final    Exercise duration (min) 09/18/2024 7  minutes Final    Exercise duration (sec) 09/18/2024 46  seconds Final    Peak HR 09/18/2024 143  bpm Final    Peak Systolic BP 09/18/2024 150  mmHg Final    Peak Diatolic BP 09/18/2024 80  mmHg Final    Peak RPP 09/18/2024 21,450   Final    Estimated METs 09/18/2024 9   Final    % Max HR Achieved 09/18/2024 92   Final    1 Minute Recovery HR 09/18/2024 125  bpm Final   Admission on 08/17/2024, Discharged on 08/17/2024   Component Date Value Ref Range Status    QRS Duration 08/16/2024 90  ms  Final    [facility] QTC Calculation 08/16/2024 424  ms Final    WBC 08/16/2024 7.80  3.90 - 12.70 K/uL Final    RBC 08/16/2024 6.13  4.60 - 6.20 M/uL Final    Hemoglobin 08/16/2024 15.8  14.0 - 18.0 g/dL Final    Hematocrit 08/16/2024 50.0  40.0 - 54.0 % Final    MCV 08/16/2024 82  82 - 98 fL Final    MCH 08/16/2024 25.8 (L)  27.0 - 31.0 pg Final    MCHC 08/16/2024 31.6 (L)  32.0 - 36.0 g/dL Final    RDW 08/16/2024 18.6 (H)  11.5 - 14.5 % Final    Platelets 08/16/2024 179  150 - 450 K/uL Final    MPV 08/16/2024 8.8 (L)  9.2 - 12.9 fL Final    Immature Granulocytes 08/16/2024 0.3  0.0 - 0.5 % Final    Gran # (ANC) 08/16/2024 5.4  1.8 - 7.7 K/uL Final    Immature Grans (Abs) 08/16/2024 0.02  0.00 - 0.04 K/uL Final    Lymph # 08/16/2024 1.7  1.0 - 4.8 K/uL Final    Mono # 08/16/2024 0.6  0.3 - 1.0 K/uL Final    Eos # 08/16/2024 0.1  0.0 - 0.5 K/uL Final    Baso # 08/16/2024 0.05  0.00 - 0.20 K/uL Final    nRBC 08/16/2024 0  0 /100 WBC Final    Gran % 08/16/2024 69.8  38.0 - 73.0 % Final    Lymph % 08/16/2024 21.2  18.0 - 48.0 % Final    Mono % 08/16/2024 7.1  4.0 - 15.0 % Final    Eosinophil % 08/16/2024 1.0  0.0 - 8.0 % Final    Basophil % 08/16/2024 0.6  0.0 - 1.9 % Final    Differential Method 08/16/2024 Automated   Final    Sodium 08/16/2024 138  136 - 145 mmol/L Final    Potassium 08/16/2024 4.4  3.5 - 5.1 mmol/L Final    Chloride 08/16/2024 102  95 - 110 mmol/L Final    CO2 08/16/2024 26  22 - 31 mmol/L Final    Glucose 08/16/2024 89  70 - 110 mg/dL Final    BUN 08/16/2024 23 (H)  9 - 21 mg/dL Final    Creatinine 08/16/2024 1.21  0.50 - 1.40 mg/dL Final    Calcium 08/16/2024 9.0  8.4 - 10.2 mg/dL Final    Total Protein 08/16/2024 8.2  6.0 - 8.4 g/dL Final    Albumin 08/16/2024 4.6  3.5 - 5.2 g/dL Final    Total Bilirubin 08/16/2024 0.7  0.2 - 1.3 mg/dL Final    Alkaline Phosphatase 08/16/2024 55  38 - 145 U/L Final    AST 08/16/2024 30  17 - 59 U/L Final    ALT 08/16/2024 36  0 - 50 U/L Final    Anion Gap 08/16/2024 10   5 - 12 mmol/L Final    eGFR 2024 >60  >60 mL/min/1.73 m^2 Final    NT-proBNP 2024 <20  5 - 900 pg/mL Final    Troponin I 2024 <0.012  0.012 - 0.034 ng/mL Final    Troponin I 2024 <0.012  0.012 - 0.034 ng/mL Final       Past Medical History:   Diagnosis Date    Allergy     BPH (benign prostatic hypertrophy)     Low serum testosterone level     Seminal vesiculitis     Testosterone deficiency     Viral hepatitis A without mention of hepatic coma      Social History     Socioeconomic History    Marital status:      Spouse name: Theresa Lucio    Number of children: 2   Occupational History    Occupation: [employer]     Comment: [employer]   Tobacco Use    Smoking status: Former     Current packs/day: 0.00     Average packs/day: 0.3 packs/day for 1 year (0.3 ttl pk-yrs)     Types: Cigarettes     Start date: 1974     Quit date: 1975     Years since quittin.9    Smokeless tobacco: Never   Substance and Sexual Activity    Alcohol use: Yes     Comment: 3x's week-beer or crown    Drug use: Never    Sexual activity: Yes     Partners: Female   Social History Narrative    Annamaria Daughter    Janelle Daughter     Social Drivers of Health     Financial Resource Strain: Low Risk  (2020)    Overall Financial Resource Strain (CARDIA)     Difficulty of Paying Living Expenses: Not hard at all   Food Insecurity: No Food Insecurity (2020)    Hunger Vital Sign     Worried About Running Out of Food in the Last Year: Never true     Ran Out of Food in the Last Year: Never true   Transportation Needs: No Transportation Needs (2020)    PRAPARE - Transportation     Lack of Transportation (Medical): No     Lack of Transportation (Non-Medical): No   Physical Activity: Insufficiently Active (2020)    Exercise Vital Sign     Days of Exercise per Week: 3 days     Minutes of Exercise per Session: 40 min   Stress: No Stress Concern Present (2019)    [employer] [place] [employer]  [employer]up[employer] - Occupational Stress Questionnaire     Feeling of Stress : Not at all     Past Surgical History:   Procedure Laterality Date    CATARACT EXTRACTION W/  INTRAOCULAR LENS IMPLANT Right 03/09/2023    EYE SURGERY Bilateral     Lasik    KNEE ARTHROSCOPY Left 05/27/2021    partial MM     ROTATOR CUFF REPAIR  02/2013    left shoulder  per Dr. Jigar Greene    VASECTOMY       Family History   Problem Relation Name Age of Onset    Alzheimer's disease Father Manny     Stroke Father Manny     Dementia Mother Wendy     Emphysema Sister Colleen     COPD Sister Colleen         ex smoker    No Known Problems Brother Manny     Eczema Neg Hx      Lupus Neg Hx      Psoriasis Neg Hx      Melanoma Neg Hx         The 10-year CVD risk score (Sonny, et al., 2008) is: 9.4%    Values used to calculate the score:      Age: 67 years      Sex: Male      Diabetic: No      Tobacco smoker: No      Systolic Blood Pressure: 90 mmHg      Is BP treated: No      HDL Cholesterol: 38 mg/dL      Total Cholesterol: 163 mg/dL    All of your core healthy metrics are met.      Review of patient's allergies indicates:  No Known Allergies    Current Outpatient Medications:     sildenafiL (VIAGRA) 50 MG tablet, Take 1 tablet (50 mg total) by mouth daily as needed for Erectile Dysfunction. Take on an empty stomach., Disp: 15 tablet, Rfl: 11    testosterone cypionate (DEPOTESTOTERONE CYPIONATE) 200 mg/mL injection, ADMINISTER 1 ML(200 MG) IN THE MUSCLE EVERY 14 DAYS, Disp: 5 mL, Rfl: 1    Review of Systems   Constitutional:  Negative for appetite change, chills, fatigue, fever and unexpected weight change.   HENT:  Negative for ear pain and trouble swallowing.    Eyes:  Negative for pain, discharge and visual disturbance.   Respiratory:  Negative for apnea, cough, shortness of breath and wheezing.    Cardiovascular:  Negative for chest pain and leg swelling.   Gastrointestinal:  Negative for abdominal pain, blood in stool, constipation,  diarrhea, nausea, vomiting and reflux.   Endocrine: Negative for cold intolerance, heat intolerance and polydipsia.   Genitourinary:  Positive for erectile dysfunction. Negative for bladder incontinence, dysuria, frequency, hematuria, testicular pain and urgency.        Nocturia 1-4 times per night   Musculoskeletal:  Positive for back pain. Negative for gait problem, joint swelling and myalgias.   Neurological:  Negative for dizziness, seizures and numbness.   Psychiatric/Behavioral:  Negative for agitation, behavioral problems and hallucinations. The patient is not nervous/anxious.            Objective:      Vitals:    12/02/24 0819   BP: 90/60   Pulse: 74   SpO2: 98%   Weight: 99.5 kg (219 lb 6.4 oz)   Height: 6' (1.829 m)     Physical Exam  Vitals and nursing note reviewed.   Constitutional:       General: He is not in acute distress.     Appearance: Normal appearance. He is well-developed. He is not toxic-appearing.   HENT:      Head: Normocephalic and atraumatic.      Right Ear: Tympanic membrane and external ear normal.      Left Ear: Tympanic membrane and external ear normal.      Nose: Nose normal.      Mouth/Throat:      Pharynx: Oropharynx is clear. No posterior oropharyngeal erythema.   Eyes:      Pupils: Pupils are equal, round, and reactive to light.   Neck:      Thyroid: No thyromegaly.      Vascular: No carotid bruit.   Cardiovascular:      Rate and Rhythm: Normal rate and regular rhythm.      Heart sounds: Normal heart sounds. No murmur heard.  Pulmonary:      Effort: Pulmonary effort is normal.      Breath sounds: Normal breath sounds. No wheezing or rales.   Abdominal:      General: Bowel sounds are normal. There is no distension.      Palpations: Abdomen is soft.      Tenderness: There is no abdominal tenderness.   Musculoskeletal:         General: No tenderness or deformity. Normal range of motion.      Cervical back: Normal range of motion and neck supple.      Lumbar back: Normal. No spasms.       Comments: Bends 60 degrees at  waist, shoulders and knees have full range of motion, no pitting edema to lower extremities   Lymphadenopathy:      Cervical: No cervical adenopathy.   Skin:     General: Skin is warm and dry.      Findings: No rash.   Neurological:      General: No focal deficit present.      Mental Status: He is alert and oriented to person, place, and time. Mental status is at baseline.      Cranial Nerves: No cranial nerve deficit.      Coordination: Coordination normal.   Psychiatric:         Mood and Affect: Mood normal.         Behavior: Behavior normal.         Thought Content: Thought content normal.         Judgment: Judgment normal.       Physical Exam    Neck: No carotid bruit.  Cardiovascular: Normal rate and regular rhythm. Normal heart sounds. No murmur heard.  Pulmonary: Pulmonary effort is normal. Normal breath sounds. No wheezing. No rales.  Musculoskeletal: Tight hamstrings. Limited back flexibility. Back flexion: 60 degrees.  Skin: Pink fingernails. Pink palms.             Assessment:       1. Polycythemia    2. BPH with obstruction/lower urinary tract symptoms    3. Low serum testosterone level    4. KATINA on CPAP    5. Other male erectile dysfunction         Plan:       Polycythemia  Hematocrit now 49, continue phlebotomy 1 unit of blood every 3 months as needed if hematocrit greater than 50.  BPH with obstruction/lower urinary tract symptoms  Still having BPH symptoms, nocturia, currently not on tamsulosin, will see Dr. Fran Ruiz in 3 weeks.  Low serum testosterone level  Continue testosterone injections 200 mg Q 2 weeks.  KATINA on CPAP  Compliant with CPAP at night  Other male erectile dysfunction  Having modest success with sildenafil 50 mg, encourage patient to increase to 100 mg per dose or consider switching to Cialis.    Assessment & Plan    Assessed fatigue as likely due to frequent nighttime urination, disrupting sleep  Evaluated prostate-related urinary symptoms,  considering medication options to improve bladder emptying and reduce nocturia  Reviewed recent cardiac stress test results, confirming no evidence of blockages or major valve problems  Analyzed cholesterol levels, noting good control without medication  Evaluated CBC and thyroid function, finding no significant abnormalities  Considered testosterone levels, noting previous low result and current replacement therapy  Assessed erectile dysfunction, determining current sildenafil dose may be inadequate  Evaluated back pain, likely due to arthritis or disc issues, recommending conservative management    BENIGN PROSTATIC HYPERPLASIA AND LOWER URINARY TRACT SYMPTOMS:  - Explained the relationship between incomplete bladder emptying and frequent nighttime urination.  - Discussed the potential benefits of medication for prostate-related urinary symptoms.  - Follow up with Dr. Ameya Ruiz on the 20th to discuss urinary symptoms and erectile dysfunction management.    ERECTILE DYSFUNCTION:  - Provided information on different erectile dysfunction medications, including sildenafil and tadalafil (Cialis).  - Increased sildenafil to 100mg as needed for erectile dysfunction.    LOW BACK PAIN:  - Educated on the importance of stretching and low-impact exercise for back pain management.  - Edwin to perform regular stretching exercises, especially for the back and hamstrings.  - Recommend increasing physical activity with low-impact exercises like walking or biking.  - Edwin to get up and walk around periodically when sitting for long periods at work.  - Recommend considering home [employer] or [employer] exercise videos for back strengthening.  - Take Tylenol or Advil as needed for back pain.    TESTICULAR HYPERFUNCTION:  - Continue testosterone injections 1mg every 2 weeks.    FOLLOW-UP:  - Follow up in 6 months (spring).  - Contact the office if current treatments are not effective or if symptoms worsen.         Follow up in  about 6 months (around 6/2/2025), or BPH, polycythemia.        This note was generated with the assistance of ambient listening technology. Verbal consent was obtained by the patient and accompanying visitor(s) for the recording of patient appointment to facilitate this note. I attest to having reviewed and edited the generated note for accuracy, though some syntax or spelling errors may persist. Please contact the author of this note for any clarification.      12/2/2024 Edwin Cottrell

## 2024-12-04 ENCOUNTER — TELEPHONE (OUTPATIENT)
Dept: NEUROLOGY | Facility: CLINIC | Age: 24
End: 2024-12-04

## 2024-12-04 NOTE — TELEPHONE ENCOUNTER
Message left on voice mail that Dr Luu has an appointment available tomorrow, 12/5, in [place] at 10:40 and if she is interested, she should call [phone], option #1 to see if it is still available.

## 2024-12-05 ENCOUNTER — OFFICE VISIT (OUTPATIENT)
Dept: NEUROLOGY | Facility: CLINIC | Age: 24
End: 2024-12-05
Payer: COMMERCIAL

## 2024-12-05 VITALS
SYSTOLIC BLOOD PRESSURE: 98 MMHG | WEIGHT: 148.81 LBS | BODY MASS INDEX: 22 KG/M2 | OXYGEN SATURATION: 99 % | DIASTOLIC BLOOD PRESSURE: 66 MMHG | HEART RATE: 86 BPM | RESPIRATION RATE: 16 BRPM

## 2024-12-05 DIAGNOSIS — G43.709 CHRONIC MIGRAINE W/O AURA W/O STATUS MIGRAINOSUS, NOT INTRACTABLE: ICD-10-CM

## 2024-12-05 DIAGNOSIS — M43.6 NECK STIFFNESS: ICD-10-CM

## 2024-12-05 DIAGNOSIS — M54.2 NECK PAIN: ICD-10-CM

## 2024-12-05 DIAGNOSIS — M54.81 BILATERAL OCCIPITAL NEURALGIA: Primary | ICD-10-CM

## 2024-12-05 PROCEDURE — 3078F DIAST BP <80 MM HG: CPT | Performed by: OTHER

## 2024-12-05 PROCEDURE — 99204 OFFICE O/P NEW MOD 45 MIN: CPT | Performed by: OTHER

## 2024-12-05 PROCEDURE — 3074F SYST BP LT 130 MM HG: CPT | Performed by: OTHER

## 2024-12-05 RX ORDER — SUMATRIPTAN 50 MG/1
TABLET, FILM COATED ORAL
Qty: 9 TABLET | Refills: 0 | Status: SHIPPED | OUTPATIENT
Start: 2024-12-05

## 2024-12-05 RX ORDER — CYCLOBENZAPRINE HCL 10 MG
10 TABLET ORAL NIGHTLY PRN
Qty: 30 TABLET | Refills: 2 | Status: SHIPPED | OUTPATIENT
Start: 2024-12-05

## 2024-12-05 RX ORDER — METHYLPREDNISOLONE 4 MG/1
TABLET ORAL
Qty: 1 EACH | Refills: 2 | Status: SHIPPED | OUTPATIENT
Start: 2024-12-05

## 2024-12-05 NOTE — PROGRESS NOTES
HPI:    Patient ID: Negar Mishra is a 24 year old female.  PCP:  Dr Nakia CASTANEDA    Negar Mishra is a 24-year-old female with history of migraines presented for evaluation of increased headaches.  Patient reports she has migraines since the age of 9 and the headaches have increased over time.  She states that she is getting headaches at different spots in the head mostly in the right occipital area at the base of the skull and at the left parietal area, describes them as a sharp shooting pain with a dull ache in the background which is continuous and associated with neck muscle tension light and sound sensitivity.  She states that the pain is almost occurring every day she gets about 25 headaches a month got worse this summer.  There is no known trigger or any preceding head or neck injury. Endorses neck pain and stiffness using Baclofen as needed. States she has remote multiple ankle injuries and right hip surgeries done for gymnastic related injuries      HISTORY:  Past Medical History:    Abdominal pain    Acute sinusitis, unspecified    ADHD (attention deficit hyperactivity disorder)    Anxiety    Back pain    Bad breath    Black stools    Bloating    Body piercing    Constipation    Contact with and (suspected) exposure to mold    Decorative tattoo    Depression    Diarrhea, unspecified    Dizziness    Dysmenorrhea    Extrinsic asthma, unspecified    Frequent urination    Frequent use of laxatives    Heartburn    Heavy menses    Indigestion    Instability, ankle/foot    Irregular bowel habits    Menses painful    Nausea    Night sweats    Pain with bowel movements    Routine infant or child health check    Stress    Weight loss      Past Surgical History:   Procedure Laterality Date    Leg/ankle surgery proc unlisted      x4    Mouth surgery proc unlisted      Other surgical history  6/2016    Dental work    Other surgical history  [id number]    Multiple surgeries on ankles bilaterally    Other  surgical history      Right Hip surgeries       Family History   Problem Relation Age of Onset    Hypertension Father     Lipids Father     Psychiatric Father         depression    Other (gout) Father     Hypertension Mother     Lipids Mother     Psychiatric Mother         anxiety, depression    Diabetes Maternal Grandmother     Lipids Maternal Grandmother     Hypertension Maternal Grandfather     Diabetes Maternal Grandfather     Diabetes Paternal Grandmother     Hypertension Paternal Grandmother     Lipids Paternal Grandmother     Cancer Paternal Grandfather         lymphoma    Hypertension Paternal Grandfather       Social History     Socioeconomic History    Marital status: Single   Tobacco Use    Smoking status: Never    Smokeless tobacco: Never   Vaping Use    Vaping status: Every Day    Substances: Nicotine   Substance and Sexual Activity    Alcohol use: No     Alcohol/week: 0.0 standard drinks of alcohol    Drug use: Yes     Types: Cannabis    Sexual activity: Not Currently     Partners: Male     Birth control/protection: OCP   Other Topics Concern    Caffeine Concern No    Exercise No    Seat Belt Yes        Review of Systems   Constitutional: Negative.    HENT: Negative.     Eyes: Negative.    Cardiovascular: Negative.    Gastrointestinal: Negative.    Endocrine: Negative.    Genitourinary: Negative.    Musculoskeletal:  Positive for neck pain and neck stiffness.   Skin: Negative.    Allergic/Immunologic: Negative.    Neurological:  Positive for headaches.   Hematological: Negative.    Psychiatric/Behavioral: Negative.     All other systems reviewed and are negative.           Current Outpatient Medications   Medication Sig Dispense Refill    escitalopram 20 MG Oral Tab Take 1 tablet (20 mg total) by mouth daily. 30 tablet 2    diphenhydrAMINE 25 MG Oral Cap Take 2 capsules (50 mg total) by mouth every 8 (eight) hours as needed for Itching. 30 capsule 0     Allergies:Allergies[1]  PHYSICAL EXAM:   Physical  Exam  Blood pressure 98/66, pulse 86, resp. rate 16, weight 148 lb 12.8 oz (67.5 kg), last menstrual period 10/05/2024, SpO2 99%, not currently breastfeeding.    General Appearance: Well nourished, well developed, no apparent distress.   HEENT: Normocephalic and atraumatic. + bioccipital tenderness   Neck: Normal range of motion. + right trapezius tenderness  Cardiovascular: Normal rate, regular rhythm and normal heart sounds.    Pulmonary/Chest: Effort normal and breath sounds normal.   Abdominal: Soft. Bowel sounds are normal.   Skin: dry, clean and intact  Ext: peripheral pulses present  Psych: normal mood and affect    Neurological:  Patient is awake, alert and oriented to person, place and time   Normal memory, attention/concentration, speech and language.    Cranial Nerves:   II: Visual acuity and visual fields: normal  III: Pupils: equal, round, reactive to light  III,IV,VI: Extra Ocular Movements: intact  V: Facial sensation: intact  VII: Facial strength: intact  VIII: Hearing: intact  IX: Palate: intact  XI: Shoulder shrug: intact  XII: Tongue movement: normal    Motor: Normal tone. Strength is  5 out of 5 in all extremities bilaterally.  DTR: present    Sensory: Sensory examination is normal to light touch and pinprick     Coordination: Finger-to-nose test normal bilaterally without evidence of dysmetria.    Gait: normal casual gait        TESTS/IMAGING:     CT head: 2019      Impression   CONCLUSION:  No acute intracranial abnormality identified.       ASSESSMENT/PLAN:       ICD-10-CM    1. Bilateral occipital neuralgia  M54.81 MRI SPINE CERVICAL (CPT=72141) [[id number]]      2. Neck stiffness  M43.6 MRI SPINE CERVICAL (CPT=72141) [[id number]]      3. Neck pain  M54.2 MRI SPINE CERVICAL (CPT=72141) [[id number]]      4. Chronic migraine w/o aura w/o status migrainosus, not intractable  G43.709         Negar is a 24-year-old female with history for migraines and neck pain presenting with increased headaches and  occipital neuralgia pain    Obtain MRI cervical spine to evaluate for any underlying cervical spondylosis and to rule out Arnold-Chiari given occipital headaches.     Take Medrol dose pack and use Cyclobenzaprine as needed at night for cervical-occipital neuralgia pain    Start Sumatriptan for migraine type headache    Depending upon MRI result will consider physical therapy      Thank you for allowing us to participate in your patient's care.      Dhruv Luu MD  [facility] [place]    This note was prepared using Dragon Medical voice recognition dictation software. As a result errors may occur. When identified these errors have been corrected. While every attempt is made to correct errors during dictation discrepancies may still exist         Meds This Visit:  Requested Prescriptions      No prescriptions requested or ordered in this encounter       Imaging & Referrals:  None     ID#[id number]         [1]   Allergies  Allergen Reactions    Other HIVES, RASH and OTHER (SEE COMMENTS)     Chloroprep - redness, blsiters    Iodine (Topical) OTHER (SEE COMMENTS)     Second degree burns    Latex      BLISTERS

## 2024-12-05 NOTE — PROGRESS NOTES
Patient reports history of migraines since 9 years of age. Last November her migraines started to get worse. Gets 24-25 migraines per month. Takes Advil or Tylenol. Excedrin migraine and migraine tension but both ineffective. Baclofen did not help either. Massager does help. Has had neck pain at base of skull mainly on right side since May. States pinpoint head pain left side of head near back and right side of head above ear near to of head. It is also painful to touch. Tried and failed Propranolol.

## 2024-12-05 NOTE — PATIENT INSTRUCTIONS
Take Medrol dose pack with food    2. Use Cyclobenzaprine 10 mg as needed     3. Take Advil or Aleve for neck pain and Sumatriptan as needed for migraine    4.  MRI cervical spine                 After your visit at the [place] office  today, please direct any follow up questions or medication needs to the staff in our [place] office so that your concerns may be promptly addressed.  We are available through [employer] or at the numbers below:    The phone number is:   [phone], option 1    The fax number is:  [phone]    Your pharmacy should also send any requests electronically to the [place] office.       Refill policies:    Allow 2-3 business days for refills; controlled substances may take longer.  Contact your pharmacy at least 5 days prior to running out of medication and have them send an electronic request or submit request through the “request refill” option in your [employer] account.  Refills are not addressed on weekends; covering physicians do not authorize routine medications on weekends.  No narcotics or controlled substances are refilled after noon on Fridays or by on call physicians.  By law, narcotics must be electronically prescribed.  A 30 day supply with no refills is the maximum allowed.  If your prescription is due for a refill, you may be due for a follow up appointment.  To best provide you care, patients receiving routine medications need to be seen at least once a year.  Patients receiving narcotic/controlled substance medications need to be seen at least once every 3 months.  In the event that your preferred pharmacy does not have the requested medication in stock (e.g. Backordered), it is your responsibility to find another pharmacy that has the requested medication available.  We will gladly send a new prescription to that pharmacy at your request.    Scheduling Tests:    If your physician has ordered radiology tests such as MRI or CT scans, please contact  Central Scheduling at [phone] right away to schedule the test.  Once scheduled, the [facility] Centralized Referral Team will work with your insurance carrier to obtain pre-certification or prior authorization.  Depending on your insurance carrier, approval may take 3-10 days.  It is highly recommended patients assure they have received an authorization before having a test performed.  If test is done without insurance authorization, patient may be responsible for the entire amount billed.      Precertification and Prior Authorizations:  If your physician has recommended that you have a procedure or additional testing performed the [facility] Centralized Referral Team will contact your insurance carrier to obtain pre-certification or prior authorization.    You are strongly encouraged to contact your insurance carrier to verify that your procedure/test has been approved and is a COVERED benefit.  Although the [facility] Centralized Referral Team does its due diligence, the insurance carrier gives the disclaimer that \"Although the procedure is authorized, this does not guarantee payment.\"    Ultimately the patient is responsible for payment.   Thank you for your understanding in this matter.  Paperwork Completion:  If you require FMLA or disability paperwork for your recovery, please make sure to either drop it off or have it faxed to our office at [phone]. Be sure the form has your name and date of birth on it.  The form will be faxed to our Forms Department and they will complete it for you.  There is a 25$ fee for all forms that need to be filled out.  Please be aware there is a 10-14 day turnaround time.  You will need to sign a release of information (KISHORE) form if your paperwork does not come with one.  You may call the Forms Department with any questions at [phone].  Their fax number is [phone].

## 2025-01-06 DIAGNOSIS — F32.A DEPRESSION, UNSPECIFIED DEPRESSION TYPE: ICD-10-CM

## 2025-01-07 RX ORDER — ESCITALOPRAM OXALATE 20 MG/1
20 TABLET ORAL DAILY
Qty: 30 TABLET | Refills: 2 | Status: SHIPPED | OUTPATIENT
Start: 2025-01-07 | End: 2025-01-08

## 2025-01-07 NOTE — TELEPHONE ENCOUNTER
Requested Prescriptions     Pending Prescriptions Disp Refills    escitalopram 20 MG Oral Tab 30 tablet 2     Sig: Take 1 tablet (20 mg total) by mouth daily.       LOV: 10/8/24  RTC:   Last Relevant Labs: 10/28/24  Filled: 10/9/24 #30 with 2 refills    Future Appointments   Date Time Provider Department Center   1/13/2025  2:15 PM  MR RM3 (3T WIDE)  [facility]   3/6/2025 10:40 AM Dhruv Luu MD [facility] [place]

## 2025-01-08 DIAGNOSIS — F32.A DEPRESSION, UNSPECIFIED DEPRESSION TYPE: ICD-10-CM

## 2025-01-08 RX ORDER — ESCITALOPRAM OXALATE 20 MG/1
20 TABLET ORAL DAILY
Qty: 90 TABLET | Refills: 0 | Status: SHIPPED | OUTPATIENT
Start: 2025-01-08

## 2025-01-08 RX ORDER — ESCITALOPRAM OXALATE 20 MG/1
20 TABLET ORAL DAILY
Qty: 30 TABLET | Refills: 2 | OUTPATIENT
Start: 2025-01-08

## 2025-01-13 ENCOUNTER — HOSPITAL ENCOUNTER (OUTPATIENT)
Dept: MRI IMAGING | Facility: HOSPITAL | Age: 25
Discharge: HOME | End: 2025-01-13
Attending: Other
Payer: COMMERCIAL

## 2025-01-13 DIAGNOSIS — M54.81 BILATERAL OCCIPITAL NEURALGIA: ICD-10-CM

## 2025-01-13 DIAGNOSIS — M54.2 NECK PAIN: ICD-10-CM

## 2025-01-13 DIAGNOSIS — M43.6 NECK STIFFNESS: ICD-10-CM

## 2025-01-13 PROCEDURE — 72141 MRI NECK SPINE W/O DYE: CPT | Performed by: OTHER

## 2025-02-07 ENCOUNTER — OFFICE VISIT (OUTPATIENT)
Dept: RHEUMATOLOGY | Facility: CLINIC | Age: 25
End: 2025-02-07
Payer: COMMERCIAL

## 2025-02-07 VITALS
TEMPERATURE: 98 F | BODY MASS INDEX: 21.48 KG/M2 | DIASTOLIC BLOOD PRESSURE: 72 MMHG | OXYGEN SATURATION: 99 % | WEIGHT: 145 LBS | SYSTOLIC BLOOD PRESSURE: 112 MMHG | RESPIRATION RATE: 16 BRPM | HEIGHT: 69 IN | HEART RATE: 104 BPM

## 2025-02-07 DIAGNOSIS — Q79.60 EHLERS-DANLOS DISEASE (HCC): Primary | ICD-10-CM

## 2025-02-07 DIAGNOSIS — Z86.69 HISTORY OF MIGRAINE HEADACHES: ICD-10-CM

## 2025-02-07 DIAGNOSIS — S73.191D TEAR OF RIGHT ACETABULAR LABRUM, SUBSEQUENT ENCOUNTER: ICD-10-CM

## 2025-02-07 DIAGNOSIS — Q79.60 EHLERS-DANLOS SYNDROME (HCC): ICD-10-CM

## 2025-02-07 DIAGNOSIS — Z98.890 HISTORY OF ANKLE SURGERY: ICD-10-CM

## 2025-02-07 DIAGNOSIS — M79.10 MYALGIA: ICD-10-CM

## 2025-02-07 PROBLEM — R11.12 PROJECTILE VOMITING: Status: RESOLVED | Noted: 2022-11-07 | Resolved: 2025-02-07

## 2025-02-07 PROBLEM — R11.0 NAUSEA: Status: RESOLVED | Noted: 2022-11-07 | Resolved: 2025-02-07

## 2025-02-07 PROBLEM — S01.101A OPEN WOUND OF RIGHT PERIOCULAR AREA: Status: RESOLVED | Noted: 2020-10-23 | Resolved: 2025-02-07

## 2025-02-07 PROBLEM — R10.9 ABDOMINAL PAIN: Status: RESOLVED | Noted: 2021-09-02 | Resolved: 2025-02-07

## 2025-02-07 PROBLEM — R19.4 CHANGE IN BOWEL HABITS: Status: RESOLVED | Noted: 2022-11-07 | Resolved: 2025-02-07

## 2025-02-07 PROBLEM — R10.13 ABDOMINAL PAIN, EPIGASTRIC: Status: RESOLVED | Noted: 2022-11-07 | Resolved: 2025-02-07

## 2025-02-07 PROCEDURE — 3074F SYST BP LT 130 MM HG: CPT | Performed by: INTERNAL MEDICINE

## 2025-02-07 PROCEDURE — 3078F DIAST BP <80 MM HG: CPT | Performed by: INTERNAL MEDICINE

## 2025-02-07 PROCEDURE — 99205 OFFICE O/P NEW HI 60 MIN: CPT | Performed by: INTERNAL MEDICINE

## 2025-02-07 PROCEDURE — 3008F BODY MASS INDEX DOCD: CPT | Performed by: INTERNAL MEDICINE

## 2025-02-07 RX ORDER — UBIDECARENONE 75 MG
250 CAPSULE ORAL DAILY
COMMUNITY

## 2025-02-07 NOTE — PROGRESS NOTES
[facility]  Report of Consultation    Negar Mishra Patient Status:  No patient class for patient encounter    2000 MRN [id number]   Location [facility] PCP Dinesh Yee MD     Date of Consult:  25     Reason for Consultation:   Chief Complaint   Patient presents with    New Patient     Patient is new to our office today. Patient states that she was recommended by PCP to see us for Elhers Danlos syndrome. Has torn ankles ligaments, has several surgeries on hips and ankles, feels chest cracking... Wakes up in pain, gets severe migraines often  Rapid 3: 3.7       History of Present Illness: Negar Mishra is a a(n) 24 year old female.   History of Ehler Danlos syndrome.    Chest is cracking.  Multiple allergies.  History of torn ligaments.    Looking to get a blood test.    Has an appointment with a genetic physician .  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  Had a 2 cm hole in the ankle bone.    Troubles since birth.  With growth spurts when young had muscle issues.    Gymnast when young.   Neurologist Dr. Camejo.  Has 25 migraines a month.    Able to do pilates and light exercises.      History:  PMH:       Ehler Danlos Syndrome                   Migraine Headaches  PSH:       Age 12   - Has nylon ligaments in her right ankle.   Right ankle surgery stem cells   Left ankle nylon ligaments.   Actually multiple ankle surgeries both left and right.   Has received stem cell right ankle.   Right hip surgery  FAMHX:   Mother double jointed  and fibromyalgia  -  right knee replaced  -anxiety.  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    Smoke - marijuana for sleep and pain  ETOH  no      Allergies: Allergies[1]    Medications:   Current Outpatient Medications:     cyanocobalamin 100 MCG Oral Tab, Take 2.5 tablets (250 mcg total) by mouth daily.,  Disp: , Rfl:     escitalopram 20 MG Oral Tab, Take 1 tablet (20 mg total) by mouth daily., Disp: 90 tablet, Rfl: 0    methylPREDNISolone (MEDROL) 4 MG Oral Tablet Therapy Pack, Take as directed, Disp: 1 each, Rfl: 2    cyclobenzaprine 10 MG Oral Tab, Take 1 tablet (10 mg total) by mouth nightly as needed for Muscle spasms., Disp: 30 tablet, Rfl: 2    SUMAtriptan 50 MG Oral Tab, Use at onset; repeat once after 2 hours-ONLY 2 IN 24 HR MAX. This is a 30 day supply., Disp: 9 tablet, Rfl: 0    diphenhydrAMINE 25 MG Oral Cap, Take 2 capsules (50 mg total) by mouth every 8 (eight) hours as needed for Itching., Disp: 30 capsule, Rfl: 0    Review of Systems:   No recent fever or chills. No recent weight loss or weight gain.  Chest cracks, gets chest pain,  costochondritis inn the past.   No shortness of breath or palpitations.  Stands up and gets light headed.   Has IBS.   2020 stomach ulcer.   No current abdominal pain, nausea, diarrhea, or vomiting.  No difficulty with urination. No blood in the stool or blood in urine.  No current difficulty with vision or hearing.  Joint pain neck. Left hand ankle. /joint swelling-see HPI. No muscle pain. No leg swelling.  No skin rashes.    Physical Exam:/72   Pulse 104   Temp 98.1 °F (36.7 °C)   Resp 16   Ht 5' 9\" (1.753 m)   Wt 145 lb (65.8 kg)   LMP 10/05/2024 (Exact Date)   SpO2 99%   BMI 21.41 kg/m²    Young woman in NAD   HEENT exam within normal limits. Nonicteric sclera. Conjunctiva normal.    Neck supple without thyromegaly, no lymphadenopathy.   Lungs are clear to auscultation and percussion.    Heart: normal sinus rhythm without murmur.    Chest wall - anteriorly trace tender   Abdomen: soft, nontender, no masses, no organomegaly.    Extremities without any cyanosis, clubbing, or edema.  Normal upper extremities.  Left ankle trace tender.   Skin: Within normal limits.    Laboratory Data: 10/28/24  Glucose 99 hemoglobin A1c 5.1 sodium 139 potassium 4.0 chloride  106 BUN 10 creatinine 0.86 calcium 10.2 GFR 98.  Alkaline phosphatase 91 AST 35 ALT 18 bilirubin 1.5 globulin 2.7  Cholesterol 151 triglycerides 61 HDL 48 LDL 91 total protein 7.6 albumin 4.9   vitamin D 25.7 TSH 1.77  White count 5.9 hemoglobin 13.3 hematocrit 40.0 platelet count 230,000    21  VINAY  negative       Imagin2025 MRI cervical spine shows #1 mild disc desiccation and annular disc bulge at C4-5 and C5-6.  No significant stenosis at either level.  #2 no acute process.  #3 normal appearance of the visualized aspects of the spinal cord.    Impression: 24-year-old woman with history of ligament and joint problems that sound consistent with Erler Danlos syndrome.  Multiple bilateral ankle surgeries due to ligament problems.  Requesting genetic testing for Erler Danlos syndrome.    1. Oralia-Danlos disease (HCC)    2. Oralia-Danlos syndrome (HCC)    3. History of ankle surgery-right and left        Recommendations:   Patient Instructions   Negar, your history is very suggestive of Erler-Danlos syndrome.  There are several types of this syndrome.  You have  hypermobile joints and lax ligaments which are found in Ehler Danlos syndrome,.  There are genetic tests for Erler-Danlos, but they are done at specialty labs.  It is not performed at our hospital or at the local [facility] lab.  The [facility] in [place] research on Ehler-Danlos syndrome.   [facility] does have a genetic testing counselor, it would be  best to talk to that person and get the genetic  test ordered form them.  The tests currently offered are not fullproof.  They are not always positive in certain cases of Ehler-Danlos syndrome.  Overall Erler Danlos syndrome is a clinical diagnosis.  Multiple gens are involved in the syndrome. You doing the right things in the sense of limiting your activity, avoiding stressful physical activity, not putting your self in situations where you could harm your joints.  Obviously if you have pain you take an  anti-inflammatory pain reliever like ibuprofen or possibly a muscle relaxant like the cyclobenzaprine.  There is no specific treatment for Erler-Danlos syndrome at this time.  I will try to get you more information.  Sincerely Dr. Aragon       Thank you for allowing me to participate in the care of your patient.    Itz Aragon MD  2/7/2025  10:19 AM         [1]   Allergies  Allergen Reactions    Other HIVES, RASH and OTHER (SEE COMMENTS)     Chloroprep - redness, blsiters    Iodine (Topical) OTHER (SEE COMMENTS)     Second degree burns    Latex      BLISTERS

## 2025-02-07 NOTE — PATIENT INSTRUCTIONS
Negar, your history is very suggestive of Erler-Danlos syndrome.  There are several types of this syndrome.  You have  hypermobile joints and lax ligaments which are found in Ehler Danlos syndrome,.  There are genetic tests for Erler-Danlos, but they are done at specialty labs.  It is not performed at our hospital or at the local [facility] lab.  The [facility] in [place] research on Ehler-Danlos syndrome.   [facility] does have a genetic testing counselor, it would be  best to talk to that person and get the genetic  test ordered form them.  The tests currently offered are not fullproof.  They are not always positive in certain cases of Ehler-Danlos syndrome.  Overall Erler Danlos syndrome is a clinical diagnosis.  Multiple gens are involved in the syndrome. You doing the right things in the sense of limiting your activity, avoiding stressful physical activity, not putting your self in situations where you could harm your joints.  Obviously if you have pain you take an anti-inflammatory pain reliever like ibuprofen or possibly a muscle relaxant like the cyclobenzaprine.  There is no specific treatment for Erler-Danlos syndrome at this time.  I will try to get you more information.  Sincerely Dr. Aragon

## 2025-02-10 ENCOUNTER — TELEPHONE (OUTPATIENT)
Facility: CLINIC | Age: 25
End: 2025-02-10

## 2025-02-25 ENCOUNTER — HOSPITAL ENCOUNTER (EMERGENCY)
Facility: HOSPITAL | Age: 25
Discharge: LEFT BEFORE DISCHARGE | End: 2025-02-25
Payer: COMMERCIAL

## 2025-02-28 ENCOUNTER — APPOINTMENT (OUTPATIENT)
Dept: GENERAL RADIOLOGY | Age: 25
End: 2025-02-28
Attending: EMERGENCY MEDICINE
Payer: COMMERCIAL

## 2025-02-28 ENCOUNTER — HOSPITAL ENCOUNTER (OUTPATIENT)
Age: 25
Discharge: HOME | End: 2025-02-28
Attending: EMERGENCY MEDICINE
Payer: COMMERCIAL

## 2025-02-28 VITALS
TEMPERATURE: 98 F | OXYGEN SATURATION: 97 % | HEART RATE: 90 BPM | WEIGHT: 160 LBS | RESPIRATION RATE: 22 BRPM | SYSTOLIC BLOOD PRESSURE: 126 MMHG | DIASTOLIC BLOOD PRESSURE: 83 MMHG | BODY MASS INDEX: 23.7 KG/M2 | HEIGHT: 69 IN

## 2025-02-28 DIAGNOSIS — S22.31XA CLOSED FRACTURE OF ONE RIB OF RIGHT SIDE, INITIAL ENCOUNTER: Primary | ICD-10-CM

## 2025-02-28 LAB
B-HCG UR QL: NEGATIVE
BILIRUB UR QL STRIP: NEGATIVE
CLARITY UR: CLEAR
COLOR UR: YELLOW
GLUCOSE UR STRIP-MCNC: NEGATIVE MG/DL
KETONES UR STRIP-MCNC: NEGATIVE MG/DL
LEUKOCYTE ESTERASE UR QL STRIP: NEGATIVE
NITRITE UR QL STRIP: NEGATIVE
PH UR STRIP: 7 [PH]
PROT UR STRIP-MCNC: NEGATIVE MG/DL
SP GR UR STRIP: 1.01
UROBILINOGEN UR STRIP-ACNC: <2 MG/DL

## 2025-02-28 PROCEDURE — 71101 X-RAY EXAM UNILAT RIBS/CHEST: CPT | Performed by: EMERGENCY MEDICINE

## 2025-02-28 PROCEDURE — 81025 URINE PREGNANCY TEST: CPT

## 2025-02-28 PROCEDURE — 99214 OFFICE O/P EST MOD 30 MIN: CPT

## 2025-02-28 PROCEDURE — 99215 OFFICE O/P EST HI 40 MIN: CPT

## 2025-02-28 PROCEDURE — 81002 URINALYSIS NONAUTO W/O SCOPE: CPT

## 2025-02-28 NOTE — ED PROVIDER NOTES
Patient Seen in: [facility]      History   No chief complaint on file.    Stated Complaint: Flank Pain    Subjective:   HPI      24-year-old female complaint of right flank pain this started about a week and a half ago just after doing a certain type of group exercise and has been bothering for few days then a few days later she was leaning over brushing her teeth and coughed and had a sharp increase in pain there is a steady mild to moderate pain but then sometimes a sharp increase with the certain movements or deep breath.  There is no fever cough or cold symptoms no urinary symptoms just finished her period has had occasional spotting.  No burning with urination.    Objective:     No pertinent past medical history.            No pertinent past surgical history.              No pertinent social history.            Review of Systems    Positive for stated complaint: Flank Pain  Other systems are as noted in HPI.  Constitutional and vital signs reviewed.      All other systems reviewed and negative except as noted above.    Physical Exam     ED Triage Vitals [02/28/25 1259]   /83   Pulse 90   Resp 22   Temp 97.9 °F (36.6 °C)   Temp src Oral   SpO2 97 %   O2 Device None (Room air)       Current Vitals:   Vital Signs  BP: 126/83  Pulse: 90  Resp: 22  Temp: 97.9 °F (36.6 °C)  Temp src: Oral    Oxygen Therapy  SpO2: 97 %  O2 Device: None (Room air)        Physical Exam  Patient is alert orient x 3 no acute distress HEENT exam within normal limits neck there is no lymphadenopathy or JVD lungs are clear cardiovascular exam shows regular rate and rhythm without murmurs abdomen soft and nontender the back there is tenderness over the lower right posterior lateral ribs.  No crepitance or deformity noted.    ED Course     Labs Reviewed   [facility] POCT URINALYSIS DIPSTICK - Abnormal; Notable for the following components:       Result Value    Blood, Urine Moderate (*)     All other components within normal  limits   POCT PREGNANCY URINE - Normal            XR RIBS WITH CHEST (3 VIEWS), RIGHT  (CPT=71101)    Result Date: 2/28/2025  CONCLUSION:  Normal cardiac and mediastinal contours.  Lungs and pleural spaces are clear.  Subtle cortical offset involving the posterior arc of the right 10th rib consistent with fracture of indeterminate chronicity.   LOCATION:  [id number]     Dictated by (CST): Pooja Barr MD on 2/28/2025 at 2:04 PM     Finalized by (CST): Pooja Barr MD on 2/28/2025 at 2:06 PM      Images independent reviewed there is a questionable subtle fracture of the right 10th rib       MDM      Initial differential diagnosis includes muscular back pain rib fracture, slipped rib renal colic pyelonephritis pleurisy but not limited these        Medical Decision Making    Pain seems muscular type pain there is a questionable rib fracture but she has a doubtful story for a direct injury to the rib but was advised use Flexeril which she has a refill prescription for an Tylenol or Advil follow-up with her doctor in a week or so if not better  Disposition and Plan     Clinical Impression:  1. Closed fracture of one rib of right side, initial encounter         Disposition:  Discharge  2/28/2025  2:15 pm    Follow-up:  Dinesh Yee MD  [address] [address], [facility] [address] [phone]          Sherrill Solis MD  [address] [facility] [address]  [facility] [id number] [phone]    In 1 week            Medications Prescribed:  Current Discharge Medication List              Supplementary Documentation:

## 2025-02-28 NOTE — DISCHARGE INSTRUCTIONS
Apply heating pad 20 minutes 4 times a day take Tylenol or Advil for milder pain Flexeril for more severe pain this make you drowsy do not drink or drive while taking it.

## 2025-03-05 NOTE — PATIENT INSTRUCTIONS
After your visit at the [place] office  today, please direct any follow up questions or medication needs to the staff in our [place] office so that your concerns may be promptly addressed.  We are available through [employer] or at the numbers below:    The phone number is:   [phone], option 1    The fax number is:  [phone]    Your pharmacy should also send any requests electronically to the [place] office.       Refill policies:    Allow 2-3 business days for refills; controlled substances may take longer.  Contact your pharmacy at least 5 days prior to running out of medication and have them send an electronic request or submit request through the “request refill” option in your [employer] account.  Refills are not addressed on weekends; covering physicians do not authorize routine medications on weekends.  No narcotics or controlled substances are refilled after noon on Fridays or by on call physicians.  By law, narcotics must be electronically prescribed.  A 30 day supply with no refills is the maximum allowed.  If your prescription is due for a refill, you may be due for a follow up appointment.  To best provide you care, patients receiving routine medications need to be seen at least once a year.  Patients receiving narcotic/controlled substance medications need to be seen at least once every 3 months.  In the event that your preferred pharmacy does not have the requested medication in stock (e.g. Backordered), it is your responsibility to find another pharmacy that has the requested medication available.  We will gladly send a new prescription to that pharmacy at your request.    Scheduling Tests:    If your physician has ordered radiology tests such as MRI or CT scans, please contact Central Scheduling at [phone] right away to schedule the test.  Once scheduled, the [facility] Centralized Referral Team will work with your insurance carrier to obtain pre-certification or prior authorization.   Depending on your insurance carrier, approval may take 3-10 days.  It is highly recommended patients assure they have received an authorization before having a test performed.  If test is done without insurance authorization, patient may be responsible for the entire amount billed.      Precertification and Prior Authorizations:  If your physician has recommended that you have a procedure or additional testing performed the [facility] Centralized Referral Team will contact your insurance carrier to obtain pre-certification or prior authorization.    You are strongly encouraged to contact your insurance carrier to verify that your procedure/test has been approved and is a COVERED benefit.  Although the [facility] Centralized Referral Team does its due diligence, the insurance carrier gives the disclaimer that \"Although the procedure is authorized, this does not guarantee payment.\"    Ultimately the patient is responsible for payment.   Thank you for your understanding in this matter.  Paperwork Completion:  If you require FMLA or disability paperwork for your recovery, please make sure to either drop it off or have it faxed to our office at [phone]. Be sure the form has your name and date of birth on it.  The form will be faxed to our Forms Department and they will complete it for you.  There is a 25$ fee for all forms that need to be filled out.  Please be aware there is a 10-14 day turnaround time.  You will need to sign a release of information (KISHORE) form if your paperwork does not come with one.  You may call the Forms Department with any questions at [phone].  Their fax number is [phone].

## 2025-03-06 ENCOUNTER — OFFICE VISIT (OUTPATIENT)
Dept: NEUROLOGY | Facility: CLINIC | Age: 25
End: 2025-03-06
Payer: COMMERCIAL

## 2025-03-06 VITALS
OXYGEN SATURATION: 98 % | RESPIRATION RATE: 16 BRPM | DIASTOLIC BLOOD PRESSURE: 60 MMHG | SYSTOLIC BLOOD PRESSURE: 102 MMHG | HEART RATE: 55 BPM | BODY MASS INDEX: 24 KG/M2 | WEIGHT: 160 LBS

## 2025-03-06 DIAGNOSIS — M54.81 BILATERAL OCCIPITAL NEURALGIA: ICD-10-CM

## 2025-03-06 DIAGNOSIS — M47.812 CERVICAL SPONDYLOSIS: ICD-10-CM

## 2025-03-06 PROCEDURE — 3074F SYST BP LT 130 MM HG: CPT | Performed by: OTHER

## 2025-03-06 PROCEDURE — 99214 OFFICE O/P EST MOD 30 MIN: CPT | Performed by: OTHER

## 2025-03-06 PROCEDURE — 3078F DIAST BP <80 MM HG: CPT | Performed by: OTHER

## 2025-03-06 RX ORDER — CYCLOBENZAPRINE HCL 10 MG
10 TABLET ORAL NIGHTLY PRN
Qty: 30 TABLET | Refills: 5 | Status: SHIPPED | OUTPATIENT
Start: 2025-03-06

## 2025-03-06 RX ORDER — SUMATRIPTAN 50 MG/1
TABLET, FILM COATED ORAL
Qty: 9 TABLET | Refills: 2 | Status: SHIPPED | OUTPATIENT
Start: 2025-03-06

## 2025-03-06 NOTE — PROGRESS NOTES
HPI:    Patient ID: Negar Mishra is a 24 year old female.  PCP:  Dr Yee    Migraine   Associated symptoms include neck pain.   Neurologic Problem  Associated symptoms include headaches and neck pain.     Patient is a 24-year-old female presented for follow-up for headaches.  She states since last seen the headache has gone down to about 10 headaches per month.  States Flexeril is helping.  Recently he was having chest pain and Xray shows right 10 th fracture of indeterminate age. Denies trauma but she does bungee fitness classes and is wondering if it is related to that.  States she saw Dr Aragon and he suggested EDS genetic tests given laxed joints and multiple ankle injuries      Negar Mishra is a 24-year-old female with history of migraines presented for evaluation of increased headaches.  Patient reports she has migraines since the age of 9 and the headaches have increased over time.  She states that she is getting headaches at different spots in the head mostly in the right occipital area at the base of the skull and at the left parietal area, describes them as a sharp shooting pain with a dull ache in the background which is continuous and associated with neck muscle tension light and sound sensitivity.  She states that the pain is almost occurring every day she gets about 25 headaches a month got worse this summer.  There is no known trigger or any preceding head or neck injury. Endorses neck pain and stiffness using Baclofen as needed. States she has remote multiple ankle injuries and right hip surgeries done for gymnastic related injuries      HISTORY:  Past Medical History:    Abdominal pain    Acute sinusitis, unspecified    ADHD (attention deficit hyperactivity disorder)    Anxiety    Back pain    Bad breath    Black stools    Bloating    Body piercing    Constipation    Contact with and (suspected) exposure to mold    Decorative tattoo    Depression    Diarrhea, unspecified    Dizziness     Dysmenorrhea    Extrinsic asthma, unspecified    Fractured rib    Frequent urination    Frequent use of laxatives    Heartburn    Heavy menses    Indigestion    Instability, ankle/foot    Irregular bowel habits    Menses painful    Nausea    Night sweats    Pain with bowel movements    Routine infant or child health check    Stress    Weight loss      Past Surgical History:   Procedure Laterality Date    Leg/ankle surgery proc unlisted      x4    Mouth surgery proc unlisted      Other surgical history  6/2016    Dental work    Other surgical history  [id number]    Multiple surgeries on ankles bilaterally    Other surgical history      Right Hip surgeries       Family History   Problem Relation Age of Onset    Hypertension Father     Lipids Father     Psychiatric Father         depression    Other (gout) Father     Hypertension Mother     Lipids Mother     Psychiatric Mother         anxiety, depression    Diabetes Maternal Grandmother     Lipids Maternal Grandmother     Hypertension Maternal Grandfather     Diabetes Maternal Grandfather     Diabetes Paternal Grandmother     Hypertension Paternal Grandmother     Lipids Paternal Grandmother     Cancer Paternal Grandfather         lymphoma    Hypertension Paternal Grandfather       Social History     Socioeconomic History    Marital status: Single   Tobacco Use    Smoking status: Never    Smokeless tobacco: Never   Vaping Use    Vaping status: Every Day    Substances: Nicotine   Substance and Sexual Activity    Alcohol use: No     Alcohol/week: 0.0 standard drinks of alcohol    Drug use: Yes     Types: Cannabis    Sexual activity: Not Currently     Partners: Male     Birth control/protection: OCP   Other Topics Concern    Caffeine Concern No    Exercise No    Seat Belt Yes        Review of Systems   Constitutional: Negative.    HENT: Negative.     Eyes: Negative.    Cardiovascular: Negative.    Gastrointestinal: Negative.    Endocrine: Negative.    Genitourinary:  Negative.    Musculoskeletal:  Positive for neck pain and neck stiffness.   Skin: Negative.    Allergic/Immunologic: Negative.    Neurological:  Positive for headaches.   Hematological: Negative.    Psychiatric/Behavioral: Negative.     All other systems reviewed and are negative.           Current Outpatient Medications   Medication Sig Dispense Refill    cyanocobalamin 100 MCG Oral Tab Take 2.5 tablets (250 mcg total) by mouth daily.      escitalopram 20 MG Oral Tab Take 1 tablet (20 mg total) by mouth daily. 90 tablet 0    cyclobenzaprine 10 MG Oral Tab Take 1 tablet (10 mg total) by mouth nightly as needed for Muscle spasms. 30 tablet 2    SUMAtriptan 50 MG Oral Tab Use at onset; repeat once after 2 hours-ONLY 2 IN 24 HR MAX. This is a 30 day supply. 9 tablet 0     Allergies:Allergies[1]  PHYSICAL EXAM:   Physical Exam  Blood pressure 102/60, pulse 55, resp. rate 16, weight 160 lb (72.6 kg), last menstrual period 02/24/2025, SpO2 98%, not currently breastfeeding.    General Appearance: Well nourished, well developed, no apparent distress.   HEENT: Normocephalic and atraumatic. + bioccipital tenderness   Neck: Normal range of motion. + right trapezius tenderness  Cardiovascular: Normal rate, regular rhythm and normal heart sounds.    Pulmonary/Chest: Effort normal and breath sounds normal.   Abdominal: Soft. Bowel sounds are normal.   Skin: dry, clean and intact  Ext: peripheral pulses present  Psych: normal mood and affect    Neurological:  Patient is awake, alert and oriented to person, place and time   Normal memory, attention/concentration, speech and language.    Cranial Nerves:   II: Visual acuity and visual fields: normal  III: Pupils: equal, round, reactive to light  III,IV,VI: Extra Ocular Movements: intact  V: Facial sensation: intact  VII: Facial strength: intact  VIII: Hearing: intact  IX: Palate: intact  XI: Shoulder shrug: intact  XII: Tongue movement: normal    Motor: Normal tone. Strength is  5  out of 5 in all extremities bilaterally.  DTR: present    Sensory: Sensory examination is normal to light touch and pinprick     Coordination: Finger-to-nose test normal bilaterally without evidence of dysmetria.    Gait: normal casual gait        TESTS/IMAGING:         MRI cervical spine: Jan 2025    Impression   CONCLUSION:    1. Mild disc desiccation and annular disc bulge at C4-5 and C5-6.  No significant stenosis noted either level, although there is slight effacement of the ventral thecal sac at both levels.  2. No acute process.  3. Normal appearance to the visualized aspects of the spinal cord and visualized hind brain structures.         CT head: 2019      Impression   CONCLUSION:  No acute intracranial abnormality identified.       ASSESSMENT/PLAN:       ICD-10-CM    1. Bilateral occipital neuralgia  M54.81       2. Cervical spondylosis  M47.812             Negar is a 24-year-old female with history for migraines and neck pain presenting with increased headaches and occipital neuralgia pain    MRI cervical spine shows mild DJD. No Arnroberto Chairi    May benefit from physical therapy but due to right rib fracture will hold it until fracture is healed  Advised to call us for the order and with EDS genetic test results as we may need vessel imaging based on test results    Continue Cyclobenzaprine as needed at night for cervical-occipital neuralgia pain  Continue  Sumatriptan for migraine type headache        Dhruv Luu MD  [facility]    This note was prepared using Dragon Medical voice recognition dictation software. As a result errors may occur. When identified these errors have been corrected. While every attempt is made to correct errors during dictation discrepancies may still exist         Meds This Visit:  Requested Prescriptions      No prescriptions requested or ordered in this encounter       Imaging & Referrals:  None     ID#[id number]         [1]   Allergies  Allergen Reactions     Other HIVES, RASH and OTHER (SEE COMMENTS)     Chloroprep - redness, blsiters    Iodine (Topical) OTHER (SEE COMMENTS)     Second degree burns    Latex      BLISTERS

## 2025-03-06 NOTE — PROGRESS NOTES
Patient reports she continues to have pain at base of head/neck. Cyclobenzaprine at night helps. 10 headaches per months. Has been getting massages also which helps neck pain.

## 2025-03-17 ENCOUNTER — TELEPHONE (OUTPATIENT)
Facility: CLINIC | Age: 25
End: 2025-03-17

## 2025-03-17 DIAGNOSIS — M85.88 OSTEOPENIA OF OTHER SITE: ICD-10-CM

## 2025-03-17 DIAGNOSIS — S22.39XA CLOSED FRACTURE OF ONE RIB, UNSPECIFIED LATERALITY, INITIAL ENCOUNTER: Primary | ICD-10-CM

## 2025-03-18 NOTE — TELEPHONE ENCOUNTER
Genetic testing negative for Ehler's Danlos test negative.  Recent rib fracture.  Bone density test ordered.  .

## 2025-03-18 NOTE — TELEPHONE ENCOUNTER
Spoke with patient. States lab done through [facility]. Waiting for Dr. Aragon to \"release\" results.     She states on the topher it states results were in on 3/13/25.

## 2025-03-18 NOTE — ADDENDUM NOTE
Addended by: KAT VALDEZ on: 3/18/2025 02:45 PM     Modules accepted: Orders     [facility] Emergency Department    [facility]  [address] [address]    Phone:  [phone]    Fax:  [phone]           Yifan Elizabeth   MRN: [id number]    Department:  [facility] Emergency Department   Date of Visit:  1/ IF THERE IS ANY CHANGE OR WORSENING OF YOUR CONDITION, CALL YOUR PRIMARY CARE PHYSICIAN AT ONCE OR RETURN IMMEDIATELY TO THE EMERGENCY DEPARTMENT.     If you have been prescribed any medication(s), please fill your prescription right away and begin taking t 19-Jul-2022

## 2025-03-26 ENCOUNTER — TELEPHONE (OUTPATIENT)
Dept: FAMILY MEDICINE CLINIC | Facility: CLINIC | Age: 25
End: 2025-03-26

## 2025-03-26 DIAGNOSIS — Z11.1 SCREENING-PULMONARY TB: ICD-10-CM

## 2025-03-26 DIAGNOSIS — Z01.84 IMMUNITY STATUS TESTING: Primary | ICD-10-CM

## 2025-03-26 NOTE — TELEPHONE ENCOUNTER
Patient is in phlebotomy school and in need of Hep B and TB quantiferon blood draw. Please send her a MCM once complete.

## 2025-04-04 ENCOUNTER — LAB ENCOUNTER (OUTPATIENT)
Dept: LAB | Age: 25
End: 2025-04-04
Attending: FAMILY MEDICINE
Payer: COMMERCIAL

## 2025-04-04 DIAGNOSIS — Z11.1 SCREENING-PULMONARY TB: ICD-10-CM

## 2025-04-04 DIAGNOSIS — Z01.84 IMMUNITY STATUS TESTING: ICD-10-CM

## 2025-04-04 LAB
HBV SURFACE AB SER QL: NONREACTIVE
HBV SURFACE AB SERPL IA-ACNC: <3.1 MIU/ML

## 2025-04-04 PROCEDURE — 86480 TB TEST CELL IMMUN MEASURE: CPT | Performed by: FAMILY MEDICINE

## 2025-04-04 PROCEDURE — 86706 HEP B SURFACE ANTIBODY: CPT | Performed by: FAMILY MEDICINE

## 2025-04-06 LAB
M TB IFN-G CD4+ T-CELLS BLD-ACNC: 0.03 IU/ML
M TB TUBERC IFN-G BLD QL: NEGATIVE
M TB TUBERC IGNF/MITOGEN IGNF CONTROL: 5.1 IU/ML
QFT TB1 AG MINUS NIL: 0.02 IU/ML
QFT TB2 AG MINUS NIL: 0 IU/ML

## (undated) NOTE — MR AVS SNAPSHOT
[facility] [address] [place] [address] [address] [facility]  [address] [id number] [phone]               Thank you for choosing us for your health care visit with Prachi Jordan MD.  We are glad to serve you and happy to provide you wit * Notice: This list has 2 medication(s) that are the same as other medications prescribed for you. Read the directions carefully, and ask your doctor or other care provider to review them with you.             Today's Orders     Rapid Strep    Complete by o 2 or less hours of screen time a day  o 1 or more hours of physical activity a day    To help children live healthy active lives, parents can:  o Be role models themselves by making healthy eating and daily physical activity the norm for their family.   o

## (undated) NOTE — ED AVS SNAPSHOT
Jenae Starr   MRN: [id number]    Department:  [facility] Emergency Department   Date of Visit:  10/2/2019           Disclosure     Insurance plans vary and the physician(s) referred by the ER may not be covered by your plan.  Please contact y tell this physician (or your personal doctor if your instructions are to return to your personal doctor) about any new or lasting problems. The primary care or specialist physician will see patients referred from the [facility] Emergency Department.  Garrison Doran

## (undated) NOTE — LETTER
Date: 9/18/2019    Patient Name: Manjinder Rcihmond          To Whom it may concern:      Please excuse from gym class due to illness from 9/10/19 - 9/13/19.          Sincerely,    Sandra Vanessa MD

## (undated) NOTE — LETTER
Date: 10/7/2019    Patient Name: Guicho Poole          To Whom it may concern: This letter has been written at the patient's request. The above patient was seen at the [facility] for treatment of a medical condition.     This patient s

## (undated) NOTE — ED AVS SNAPSHOT
THE [facility] Emergency Department in [address] [facility]    Phone:  [phone]    Fax:  [phone]   MRN: [id number]    Department:  THE [facility] Emergency Department in [place]   Date of Visi Pediatric [phone] Emergency Department   [phone]       To Check ER Wait Times:  TEXT 'ERwait' to 90196      Click [email or web address]      Or call [phone]    If you have any problems with your follow-up, please call our case [facility] before you leave. After you leave, you should follow the attached instructions. I have read and understand the instructions given to me by my caregivers. 24[facility]Hour Pharmacies        Pharmacy Address Phone Number   [facility] [address] Procedure changed from XR KNEE ROUTINE (3 VIEWS), RIGHT (CPT=73562)        Final result    Impression:    PROCEDURE:  XR KNEE (1 OR 2 VIEWS), RIGHT (CPT=73560)     COMPARISON:  [facility] , XR KNEE ROUTINE (3 VIEWS), LEFT (CPT=73562), 10/25/2016, 12:53.      I

## (undated) NOTE — LETTER
Date: 11/1/2019    Patient Name: Red Espinoza          To Whom it may concern:     This letter has been written at the patient's request. The above patient was seen at the [facility] for treatment of a medical condition, Infectious mononu

## (undated) NOTE — LETTER
January 30, 2017    Patient: Manjinder Richmond   Date of Visit: 1/30/2017       To Whom It May Concern:    Shazia Hendricks was seen and treated in our emergency department on 1/30/2017. She should not return to school until 1/31/2017.     If you have an

## (undated) NOTE — LETTER
Date & Time: 10/24/2022, 12:06 AM  Patient: Deanna Michael  Encounter Provider(s): Edilia Robertson DO       To Whom It May Concern:    Casey Doan was seen and treated in our department on 10/23/2022.  She is to be excused from work for 3 days    If you have any questions or concerns, please do not hesitate to call.        _____________________________  Physician/APC Signature

## (undated) NOTE — LETTER
October 2, 2019    Patient: Jani Duran   Date of Visit: 10/2/2019       To Whom It May Concern:    Maverick Medrano was seen and treated in our emergency department on 10/2/2019. She should not return to work until after 10/5.  Please excuse her f

## (undated) NOTE — LETTER
Date: 2/10/2020    Patient Name: Gissell Johnston          To Whom it may concern: This letter has been written at the patient's request. The above patient was seen at the [facility] for treatment of a medical condition.     This patient s

## (undated) NOTE — LETTER
January 30, 2017    Patient: Elieser Peraza   Date of Visit: 1/30/2017       To Whom It May Concern:    Jose Cyr was seen and treated in our emergency department on 1/30/2017. She should not participate in gym/sports until 2/6/2017.     If you

## (undated) NOTE — LETTER
February 21, 2018    Patient: Ronny All   Date of Visit: 2/21/2018       To Whom It May Concern:    Kashif Case was seen and treated in our emergency department on 2/21/2018.  She should not participate in gym/sports until Wednesday, Februar

## (undated) NOTE — ED AVS SNAPSHOT
[facility] in [address] [facility]    Phone:  [phone]    Fax:  [phone]   MRN: [id number]    Department:  [facility] in [place]   Date of Visi IF THERE IS ANY CHANGE OR WORSENING OF YOUR CONDITION, CALL YOUR PRIMARY CARE PHYSICIAN AT ONCE OR RETURN IMMEDIATELY TO THE EMERGENCY DEPARTMENT.     If you have been prescribed any medication(s), please fill your prescription right away and begin taking t

## (undated) NOTE — ED AVS SNAPSHOT
[facility] Emergency Department  [place]  [address] [address]  Phone:  [phone]  Fax:  [address]   MRN: [id number]    Department:  [facility] Emergency Department   Date of Visit:  7/21/2017 IF THERE IS ANY CHANGE OR WORSENING OF YOUR CONDITION, CALL YOUR PRIMARY CARE PHYSICIAN AT ONCE OR RETURN IMMEDIATELY TO THE EMERGENCY DEPARTMENT.     If you have been prescribed any medication(s), please fill your prescription right away and begin taking t

## (undated) NOTE — LETTER
Date & Time: 9/28/2019, 1:07 PM  Patient: Mady Im  Encounter Provider(s):    Cheri Encarnacion MD       To Whom It May Concern:    Vic West was seen and treated in our department on 9/28/2019. Please excuse her from work today. .    If y

## (undated) NOTE — LETTER
Date: 2/14/2018    Patient Name: Evelina Rosas          To Whom it may concern: The above patient was seen at the [facility] for treatment of a medical condition.     This patient should be excused from attending school from 2/14/18 th

## (undated) NOTE — ED AVS SNAPSHOT
Connor Abdirashid   MRN: [id number]    Department:  [facility] Emergency Department   Date of Visit:  2/21/2018           Disclosure     Insurance plans vary and the physician(s) referred by the ER may not be covered by your plan.  Please contact y tell this physician (or your personal doctor if your instructions are to return to your personal doctor) about any new or lasting problems. The primary care or specialist physician will see patients referred from the [facility] Emergency Department.  Pauly Hargrove

## (undated) NOTE — ED AVS SNAPSHOT
Arsenio Brenner   MRN: [id number]    Department:  [facility] Emergency Department   Date of Visit:  1/31/2018           Disclosure     Insurance plans vary and the physician(s) referred by the ER may not be covered by your plan.  Please contact y tell this physician (or your personal doctor if your instructions are to return to your personal doctor) about any new or lasting problems. The primary care or specialist physician will see patients referred from the [facility] Emergency Department.  Rell Jacob

## (undated) NOTE — LETTER
Date: 10/7/2019    Patient Name: Theron Barrett          To Whom it may concern: This letter has been written at the patient's request. The above patient was seen at the [facility] for treatment of a medical condition.     This patient s

## (undated) NOTE — MR AVS SNAPSHOT
[facility] [address] [place] [address] [address] [facility]  [address] [address] [phone]               Thank you for choosing us for your health care visit with Vicki العراقي MD.  We are glad to serve you and happy to provide you wit Commonly known as:  INDERAL                Where to Get Your Medications      These medications were sent to [place] [address], [address] [facility] [place] [address] [facility], [phone], [phone]  [address] [place] [address], [facility] [address]     P

## (undated) NOTE — LETTER
[address] [facility], [address] [facility] [address]  [facility] [address]  [phone]               10/24/2019      RE: Roque Palomo  : 2000      To Whom It May Concern:    Mary Raymundo is under my medical care.  Please

## (undated) NOTE — ED AVS SNAPSHOT
Arsenio Brenner   MRN: [id number]    Department:  [facility] Emergency Department   Date of Visit:  9/28/2019           Disclosure     Insurance plans vary and the physician(s) referred by the ER may not be covered by your plan.  Please contact y tell this physician (or your personal doctor if your instructions are to return to your personal doctor) about any new or lasting problems. The primary care or specialist physician will see patients referred from the [facility] Emergency Department.  Alivia Knowles

## (undated) NOTE — LETTER
October 2, 2019    Patient: John Regalado   Date of Visit: 10/2/2019       To Whom It May Concern:    Araceli Nuñez was seen and treated in our emergency department on 10/2/2019.  She should not participate in gym/sports until cleared by a physici

## (undated) NOTE — LETTER
Date: 10/11/2019    Patient Name: Conor Seo          To Whom it may concern: This letter has been written at the patient's request. The above patient was seen at the [facility] for treatment of a medical condition.     This patient

## (undated) NOTE — LETTER
Date & Time: 1/17/2023, 12:13 PM  Patient: Colton Yañez  Encounter Provider(s):    Jadiel Andrade PA-C       To Whom It May Concern:    Coby Maria was seen and treated in our department on 1/17/2023. She should not return to work until 1/20/23.     If you have any questions or concerns, please do not hesitate to call.        _____________________________  Physician/APC Signature

## (undated) NOTE — LETTER
Date: 2/8/2020    Patient Name: Chelsey Aguayo          To Whom it may concern: This letter has been written at the patient's request. The above patient was seen at the [facility] for treatment of a medical condition.     This patient sh